# Patient Record
Sex: MALE | Race: WHITE | NOT HISPANIC OR LATINO | Employment: FULL TIME | URBAN - METROPOLITAN AREA
[De-identification: names, ages, dates, MRNs, and addresses within clinical notes are randomized per-mention and may not be internally consistent; named-entity substitution may affect disease eponyms.]

---

## 2017-02-06 ENCOUNTER — MYC MEDICAL ADVICE (OUTPATIENT)
Dept: FAMILY MEDICINE | Facility: CLINIC | Age: 52
End: 2017-02-06

## 2017-02-06 DIAGNOSIS — E78.5 HYPERLIPIDEMIA LDL GOAL <130: Primary | ICD-10-CM

## 2017-02-06 DIAGNOSIS — Z82.49 FH: CARDIOVASCULAR DISEASE: ICD-10-CM

## 2017-02-07 NOTE — TELEPHONE ENCOUNTER
Previous order for lipid and alt was over a year old, renewed the orders and advised via mychart, per his request   Tiff Costello RNC

## 2017-02-08 ENCOUNTER — MYC MEDICAL ADVICE (OUTPATIENT)
Dept: FAMILY MEDICINE | Facility: CLINIC | Age: 52
End: 2017-02-08

## 2017-02-08 ENCOUNTER — TELEPHONE (OUTPATIENT)
Dept: FAMILY MEDICINE | Facility: CLINIC | Age: 52
End: 2017-02-08

## 2017-02-08 NOTE — TELEPHONE ENCOUNTER
Dr. Reynolds,    Patient questioning if you want him to have a PSA test when he is having his Lipids done on 2/20/17.  I have pended for your approval or not. Tamra TREJO RN

## 2017-02-08 NOTE — TELEPHONE ENCOUNTER
I sent a [employer] message explaining to patient current prostate screening recommendations. Will wait for patient reply before ordering the test.

## 2017-02-20 DIAGNOSIS — Z82.49 FH: CARDIOVASCULAR DISEASE: ICD-10-CM

## 2017-02-20 DIAGNOSIS — E78.5 HYPERLIPIDEMIA LDL GOAL <130: ICD-10-CM

## 2017-02-20 LAB
ALT SERPL W P-5'-P-CCNC: 37 U/L (ref 0–70)
CHOLEST SERPL-MCNC: 207 MG/DL
HDLC SERPL-MCNC: 38 MG/DL
LDLC SERPL CALC-MCNC: 120 MG/DL
NONHDLC SERPL-MCNC: 169 MG/DL
TRIGL SERPL-MCNC: 246 MG/DL

## 2017-02-20 PROCEDURE — 80061 LIPID PANEL: CPT | Performed by: FAMILY MEDICINE

## 2017-02-20 PROCEDURE — 36415 COLL VENOUS BLD VENIPUNCTURE: CPT | Performed by: FAMILY MEDICINE

## 2017-02-20 PROCEDURE — 84460 ALANINE AMINO (ALT) (SGPT): CPT | Performed by: FAMILY MEDICINE

## 2017-02-22 ENCOUNTER — OFFICE VISIT (OUTPATIENT)
Dept: FAMILY MEDICINE | Facility: CLINIC | Age: 52
End: 2017-02-22
Payer: COMMERCIAL

## 2017-02-22 VITALS
HEART RATE: 75 BPM | WEIGHT: 223.4 LBS | TEMPERATURE: 97.7 F | DIASTOLIC BLOOD PRESSURE: 78 MMHG | HEIGHT: 74 IN | BODY MASS INDEX: 28.67 KG/M2 | SYSTOLIC BLOOD PRESSURE: 108 MMHG

## 2017-02-22 DIAGNOSIS — Z13.1 SCREENING FOR DIABETES MELLITUS: ICD-10-CM

## 2017-02-22 DIAGNOSIS — E78.5 HYPERLIPIDEMIA LDL GOAL <130: ICD-10-CM

## 2017-02-22 DIAGNOSIS — E66.3 OVERWEIGHT (BMI 25.0-29.9): ICD-10-CM

## 2017-02-22 DIAGNOSIS — I77.810 ASCENDING AORTA DILATATION (H): ICD-10-CM

## 2017-02-22 DIAGNOSIS — Z11.59 NEED FOR HEPATITIS C SCREENING TEST: ICD-10-CM

## 2017-02-22 DIAGNOSIS — Z00.01 ENCOUNTER FOR ROUTINE ADULT MEDICAL EXAM WITH ABNORMAL FINDINGS: Primary | ICD-10-CM

## 2017-02-22 PROCEDURE — 99396 PREV VISIT EST AGE 40-64: CPT | Performed by: FAMILY MEDICINE

## 2017-02-22 RX ORDER — SIMVASTATIN 20 MG
20 TABLET ORAL AT BEDTIME
Qty: 90 TABLET | Refills: 0 | Status: SHIPPED | OUTPATIENT
Start: 2017-02-22 | End: 2017-05-22

## 2017-02-22 NOTE — PATIENT INSTRUCTIONS
Observe low fat diet.    Eat 5 cups of vegetables, fruits and whole grains per day.  Limit starchy food (white rice, white bread, white pasta, white potatoes) to less than a cup per meal.  Minimize sweets, junk food and fastfood.  Exercise: moderate intensity sustained for at least 30 mins per episode, goal of 150 mins per week at least  Weight loss goal: 5-10 % of current weight in 4-6 months.    Thank you for choosing [facility].  You may be receiving a survey in the mail from [employer] regarding your visit today.  Please take a few minutes to complete and return the survey to let us know how we are doing.      If you have questions or concerns, please contact us via [facility] or you can contact your care team at [phone].    Our Clinic hours are:  Monday 6:40 am  to 7:00 pm  Tuesday -Friday 6:40 am to 5:00 pm    The [place] outpatient lab hours are:  Monday - Friday 6:10 am to 4:45 pm  Saturdays 7:00 am to 11:00 am  Appointments are required, call [phone]    If you have clinical questions after hours or would like to schedule an appointment,  call the clinic at [phone].    Preventive Health Recommendations  Male Ages 50   64    Yearly exam:             See your health care provider every year in order to  o   Review health changes.   o   Discuss preventive care.    o   Review your medicines if your doctor has prescribed any.     Have a cholesterol test every 5 years, or more frequently if you are at risk for high cholesterol/heart disease.     Have a diabetes test (fasting glucose) every three years. If you are at risk for diabetes, you should have this test more often.     Have a colonoscopy at age 50, or have a yearly FIT test (stool test). These exams will check for colon cancer.      Talk with your health care provider about whether or not a prostate cancer screening test (PSA) is right for you.    You should be tested each year for STDs (sexually transmitted diseases), if you re at risk.      Shots: Get a flu shot each year. Get a tetanus shot every 10 years.     Nutrition:    Eat at least 5 servings of fruits and vegetables daily.     Eat whole-grain bread, whole-wheat pasta and brown rice instead of white grains and rice.     Talk to your provider about Calcium and Vitamin D.     Lifestyle    Exercise for at least 150 minutes a week (30 minutes a day, 5 days a week). This will help you control your weight and prevent disease.     Limit alcohol to one drink per day.     No smoking.     Wear sunscreen to prevent skin cancer.     See your dentist every six months for an exam and cleaning.     See your eye doctor every 1 to 2 years.    Low-Fat Diet    A low-fat diet will help you lose weight. It also can lower cholesterol and prevent symptoms of gallbladder disease. The average American diet contains up to 50% fat. This means that 50% of all calories come from fat (80 grams to 100 grams of fat per day). Choosing normal portions of foods from the list below can help lower your fat intake. The [place] of [employer] recommends 20% to 35% of your daily calories come from fat. The American Heart Association suggests limiting the amount of unhealthy fats in your diet to fewer than 7% of calories from saturated fats and fewer than 1% from trans fats. The remaining 65% to 80% of calories will come from protein and carbohydrates. This is much healthier for you.  Beverages  Ok: Nonfat milk, coffee, tea, carbonated beverages  Avoid: Whole and reduced-fat milk, evaporated and condensed milk; hot chocolate mixes, milk shakes, malts, eggnog  Bread  Ok: Whole wheat or rye bread, oliverio or soda crackers, akash toast, plain rolls, bagels, English muffins  Avoid: Rolls and breads containing whole milk or egg, waffles, pancakes, biscuits, corn bread; cheese crackers, other flavored crackers, pastries, doughnuts  Cereal  Ok: Oatmeal, whole wheat, bran, multi grain, rice  Avoid: Granola or other cereals containing oil,  "coconut, or more than 2 grams of fat per serving  Desserts  Ok: Gelatin, slushy, mendez food cake, puddings or sherbet made with nonfat milk, meringues, and nonfat yogurt  Avoid: Any other commercially prepared desserts or desserts containing fat, whole milk, cream, chocolate, and coconut  Fats  Ok: You may have up to 3 teaspoons of fat daily. This can be in the form of butter, margarine, mayonnaise, or healthy oils (canola or olive)  Avoid: Cream, non-dairy creams, cream cheese, gravies, and cream sauces  Fruits  Ok: All fruits prepared without fat  Avoid: Coconut, olives  Meats, poultry, fish  Ok: Limit meat to 6 oz daily (broiled, roasted, baked, grilled, or boiled). Select lean cuts, well-trimmed of fat: beef, fish, lamb, pork and canned fish packed in water; chicken and turkey with the skin removed.  Avoid: Fried meats, fish, poultry, fried eggs and fish canned in oils; fatty meats such as calero, sausage, corned beef, hot dogs, luncheon meats, or meats with gravies and sauces  Cheese and eggs  Ok: Cheeses labeled \"low fat\"; 3 whole eggs per week, egg whites and egg substitutes as desired  Avoid: All other cheeses  Potatoes, beans, pasta  Ok: Dried beans, split peas, lentils, potatoes, rice, pasta prepared without added fat  Avoid: French fries, potato chips, potatoes prepared with butter, refried beans  Soups  Ok: Bouillon or broth soups without fat and with allowed vegetables  Avoid: Cream based soups  Vegetables  Ok: Fresh, frozen, canned or dried vegetables all prepared without added fat  Avoid: Fried vegetables and those prepared with butter, cream, sauces  Miscellaneous  Ok: Salt, sugar, jelly, hard candy, marshmallows, honey, syrup, spices and herbs, mustard, catsup, lemon, vinegar (to maintain an overall healthy diet, try to limit sweets and added sugars)  Avoid: Pizza, chocolate, nuts, coconut, cream candies, sunflower, sesame, and other seeds, fried foods, and cream sauces and gravies    7879-6329 The " [employer]. [address] [place] [address], [place] [address]. All rights reserved. This information is not intended as a substitute for professional medical care. Always follow your healthcare professional's instructions.

## 2017-02-22 NOTE — MR AVS SNAPSHOT
After Visit Summary   2/22/2017    Bright Andrew    MRN: [id number]           Patient Information     Date Of Birth          1965        Visit Information        Provider Department      2/22/2017 7:00 AM Moises Reynolds MD [facility]        Today's Diagnoses     Encounter for routine adult medical exam with abnormal findings    -  1    Overweight (BMI 25.0-29.9)        Hyperlipidemia LDL goal <130        Ascending aorta dilatation (H)        Need for hepatitis C screening test        Screening for diabetes mellitus          Care Instructions    Observe low fat diet.    Eat 5 cups of vegetables, fruits and whole grains per day.  Limit starchy food (white rice, white bread, white pasta, white potatoes) to less than a cup per meal.  Minimize sweets, junk food and fastfood.  Exercise: moderate intensity sustained for at least 30 mins per episode, goal of 150 mins per week at least  Weight loss goal: 5-10 % of current weight in 4-6 months.    Thank you for choosing [facility].  You may be receiving a survey in the mail from [email or web address] regarding your visit today.  Please take a few minutes to complete and return the survey to let us know how we are doing.      If you have questions or concerns, please contact us via [employer] or you can contact your care team at [phone].    Our Clinic hours are:  Monday 6:40 am  to 7:00 pm  Tuesday -Friday 6:40 am to 5:00 pm    The [place] outpatient lab hours are:  Monday - Friday 6:10 am to 4:45 pm  Saturdays 7:00 am to 11:00 am  Appointments are required, call [phone]    If you have clinical questions after hours or would like to schedule an appointment,  call the clinic at [phone].    Preventive Health Recommendations  Male Ages 50 - 64    Yearly exam:             See your health care provider every year in order to  o   Review health changes.   o   Discuss preventive care.    o   Review your medicines if your doctor  has prescribed any.     Have a cholesterol test every 5 years, or more frequently if you are at risk for high cholesterol/heart disease.     Have a diabetes test (fasting glucose) every three years. If you are at risk for diabetes, you should have this test more often.     Have a colonoscopy at age 50, or have a yearly FIT test (stool test). These exams will check for colon cancer.      Talk with your health care provider about whether or not a prostate cancer screening test (PSA) is right for you.    You should be tested each year for STDs (sexually transmitted diseases), if you re at risk.     Shots: Get a flu shot each year. Get a tetanus shot every 10 years.     Nutrition:    Eat at least 5 servings of fruits and vegetables daily.     Eat whole-grain bread, whole-wheat pasta and brown rice instead of white grains and rice.     Talk to your provider about Calcium and Vitamin D.     Lifestyle    Exercise for at least 150 minutes a week (30 minutes a day, 5 days a week). This will help you control your weight and prevent disease.     Limit alcohol to one drink per day.     No smoking.     Wear sunscreen to prevent skin cancer.     See your dentist every six months for an exam and cleaning.     See your eye doctor every 1 to 2 years.    Low-Fat Diet    A low-fat diet will help you lose weight. It also can lower cholesterol and prevent symptoms of gallbladder disease. The average American diet contains up to 50% fat. This means that 50% of all calories come from fat (80 grams to 100 grams of fat per day). Choosing normal portions of foods from the list below can help lower your fat intake. The [place] of Medicine recommends 20% to 35% of your daily calories come from fat. The American Heart Association suggests limiting the amount of unhealthy fats in your diet to fewer than 7% of calories from saturated fats and fewer than 1% from trans fats. The remaining 65% to 80% of calories will come from protein and  "carbohydrates. This is much healthier for you.  Beverages  Ok: Nonfat milk, coffee, tea, carbonated beverages  Avoid: Whole and reduced-fat milk, evaporated and condensed milk; hot chocolate mixes, milk shakes, malts, eggnog  Bread  Ok: Whole wheat or rye bread, oliverio or soda crackers, akash toast, plain rolls, bagels, English muffins  Avoid: Rolls and breads containing whole milk or egg, waffles, pancakes, biscuits, corn bread; cheese crackers, other flavored crackers, pastries, doughnuts  Cereal  Ok: Oatmeal, whole wheat, bran, multi grain, rice  Avoid: Granola or other cereals containing oil, coconut, or more than 2 grams of fat per serving  Desserts  Ok: Gelatin, slushy, mendez food cake, puddings or sherbet made with nonfat milk, meringues, and nonfat yogurt  Avoid: Any other commercially prepared desserts or desserts containing fat, whole milk, cream, chocolate, and coconut  Fats  Ok: You may have up to 3 teaspoons of fat daily. This can be in the form of butter, margarine, mayonnaise, or healthy oils (canola or olive)  Avoid: Cream, non-dairy creams, cream cheese, gravies, and cream sauces  Fruits  Ok: All fruits prepared without fat  Avoid: Coconut, olives  Meats, poultry, fish  Ok: Limit meat to 6 oz daily (broiled, roasted, baked, grilled, or boiled). Select lean cuts, well-trimmed of fat: beef, fish, lamb, pork and canned fish packed in water; chicken and turkey with the skin removed.  Avoid: Fried meats, fish, poultry, fried eggs and fish canned in oils; fatty meats such as calero, sausage, corned beef, hot dogs, luncheon meats, or meats with gravies and sauces  Cheese and eggs  Ok: Cheeses labeled \"low fat\"; 3 whole eggs per week, egg whites and egg substitutes as desired  Avoid: All other cheeses  Potatoes, beans, pasta  Ok: Dried beans, split peas, lentils, potatoes, rice, pasta prepared without added fat  Avoid: French fries, potato chips, potatoes prepared with butter, refried beans  Soups  Ok: " Bouillon or broth soups without fat and with allowed vegetables  Avoid: Cream based soups  Vegetables  Ok: Fresh, frozen, canned or dried vegetables all prepared without added fat  Avoid: Fried vegetables and those prepared with butter, cream, sauces  Miscellaneous  Ok: Salt, sugar, jelly, hard candy, marshmallows, honey, syrup, spices and herbs, mustard, catsup, lemon, vinegar (to maintain an overall healthy diet, try to limit sweets and added sugars)  Avoid: Pizza, chocolate, nuts, coconut, cream candies, sunflower, sesame, and other seeds, fried foods, and cream sauces and gravies    2816-1596 The "[employer] [address] [place] [address]. All rights reserved. This information is not intended as a substitute for professional medical care. Always follow your healthcare professional's instructions.              Follow-ups after your visit        Future tests that were ordered for you today     Open Future Orders        Priority Expected Expires Ordered    Lipid panel reflex to direct LDL Routine 5/24/2017 8/23/2017 2/22/2017    ALT Routine 5/24/2017 8/23/2017 2/22/2017    Echocardiogram Complete Routine  2/22/2018 2/22/2017    Glucose Routine 5/1/2017 2/22/2018 2/22/2017            Who to contact     If you have questions or need follow up information about today's clinic visit or your schedule please contact [facility] directly at [phone].  Normal or non-critical lab and imaging results will be communicated to you by [employer], letter or phone within 4 business days after the clinic has received the results. If you do not hear from us within 7 days, please contact the clinic through [employer] or phone. If you have a critical or abnormal lab result, we will notify you by phone as soon as possible.  Submit refill requests through mascotsecret or call your pharmacy and they will forward the refill request to us. Please allow 3 business days for your refill to be completed.           "Additional Information About Your Visit        [employer] Information     [employer] gives you secure access to your electronic health record. If you see a primary care provider, you can also send messages to your care team and make appointments. If you have questions, please call your primary care clinic.  If you do not have a primary care provider, please call [phone] and they will assist you.        Care EveryWhere ID     This is your Care EveryWhere ID. This could be used by other organizations to access your [facility] medical records  [id number]        Your Vitals Were     Pulse Temperature Height BMI (Body Mass Index)          75 97.7  F (36.5  C) (Tympanic) 6' 2.25\" (1.886 m) 28.49 kg/m2         Blood Pressure from Last 3 Encounters:   02/22/17 108/78   08/29/16 128/80   03/24/16 121/76    Weight from Last 3 Encounters:   02/22/17 223 lb 6.4 oz (101.3 kg)   06/09/16 220 lb (99.8 kg)   03/24/16 220 lb (99.8 kg)              We Performed the Following     Hepatitis C Screen Reflex to HCV RNA Quant and Genotype          Where to get your medicines      These medications were sent to [facility] [address]  [address] [facility] [address]     Phone:  [phone]     simvastatin 20 MG tablet          Primary Care Provider Office Phone # Fax #    Moises Reynolds [phone]       [address] [address]        Thank you!     Thank you for choosing [facility]  for your care. Our goal is always to provide you with excellent care. Hearing back from our patients is one way we can continue to improve our services. Please take a few minutes to complete the written survey that you may receive in the mail after your visit with us. Thank you!             Your Updated Medication List - Protect others around you: Learn how to safely use, store and throw away your medicines at " www.disposemymeds.org.          This list is accurate as of: 2/22/17  7:49 AM.  Always use your most recent med list.                   Brand Name Dispense Instructions for use    ADVIL 200 MG capsule   Generic drug:  ibuprofen      Take 200 mg by mouth every 4 hours as needed. Taking 800 mg every 6 hours       aspirin 81 MG tablet      Take 81 mg by mouth daily       fish oil-omega-3 fatty acids 1000 MG capsule     90 capsule    Take 1 capsule (1 g) by mouth daily       loratadine 10 MG tablet    CLARITIN     Take 10 mg by mouth daily as needed.       simvastatin 20 MG tablet    ZOCOR    90 tablet    Take 1 tablet (20 mg) by mouth At Bedtime

## 2017-02-22 NOTE — PROGRESS NOTES
SUBJECTIVE:     CC: Birght Andrew is an 51 year old male who presents for preventative health visit.     Physical   Annual:     Getting at least 3 servings of Calcium per day::  Yes    Bi-annual eye exam::  Yes    Dental care twice a year::  Yes    Sleep apnea or symptoms of sleep apnea::  None    Diet::  Low fat/cholesterol    Frequency of exercise::  2-3 days/week    Duration of exercise::  15-30 minutes    Taking medications regularly::  Yes    Medication side effects::  None    Additional concerns today::  No    Would like to discuss getting another heart scan, echocardiogram, family history, father  with heart issue, brother has enlarged aorta, he has been checked but would like to update.  In 2015, echo showed no change of borderline aortic dilatation in 3 years. Patient denies chest pain, shortness of breath, dizziness or LOC.    Today's PHQ-2 Score:   PHQ-2 (  [employer]) 2017   Little interest or pleasure in doing things Not at all   Feeling down, depressed or hopeless Not at all   PHQ-2 Score 0       Abuse: Current or Past(Physical, Sexual or Emotional)- No  Do you feel safe in your environment - Yes    Social History   Substance Use Topics     Smoking status: Never Smoker     Smokeless tobacco: Never Used     Alcohol use Yes      Comment: few times a week     The patient does not drink >3 drinks per day nor >7 drinks per week.    Last PSA:   PSA   Date Value Ref Range Status   2015 1.02 0 - 4 ug/L Final       Recent Labs   Lab Test  17   0713  16   0635  01/21/15   0631  14   0739   CHOL  207*  207*  201*  203*   HDL  38*  48  43  38*   LDL  120*  122*  124  147*   TRIG  246*  187*  169*  90   CHOLHDLRATIO   --    --   4.7  5.0   NHDL  169*  159*   --    --        Reviewed orders with patient. Reviewed health maintenance and updated orders accordingly - Yes    All Histories reviewed and updated in Epic.  Past Medical History   Diagnosis Date     Cellulitis and  abscess of unspecified site      recurrent      Chest pain, unspecified       Past Surgical History   Procedure Laterality Date     Colonoscopy N/A 11/20/2015     Procedure: COLONOSCOPY;  Surgeon: Melquiades Street MD;  Location: [facility]       ROS:  C: NEGATIVE for fever, chills, change in weight  I: NEGATIVE for worrisome rashes, moles or lesions  E: NEGATIVE for vision changes or irritation  ENT: NEGATIVE for ear, mouth and throat problems  R: NEGATIVE for significant cough or SOB  CV: NEGATIVE for chest pain, palpitations or peripheral edema  GI: NEGATIVE for nausea, abdominal pain, heartburn, or change in bowel habits   male: negative for dysuria, hematuria, decreased urinary stream, erectile dysfunction, urethral discharge  M: NEGATIVE for significant arthralgias or myalgia  N: NEGATIVE for weakness, dizziness or paresthesias  E: NEGATIVE for temperature intolerance, skin/hair changes  H: NEGATIVE for bleeding problems  P: NEGATIVE for changes in mood or affect    Labs reviewed in EPIC  BP Readings from Last 3 Encounters:   02/22/17 108/78   08/29/16 128/80   03/24/16 121/76    Wt Readings from Last 3 Encounters:   02/22/17 223 lb 6.4 oz (101.3 kg)   06/09/16 220 lb (99.8 kg)   03/24/16 220 lb (99.8 kg)                  Patient Active Problem List   Diagnosis     Hyperlipidemia LDL goal <130     Screening for hypertension     FH: cardiovascular disease     Lumbago     Overweight (BMI 25.0-29.9)     Past Surgical History   Procedure Laterality Date     Colonoscopy N/A 11/20/2015     Procedure: COLONOSCOPY;  Surgeon: Melquiades Street MD;  Location: [facility]       Social History   Substance Use Topics     Smoking status: Never Smoker     Smokeless tobacco: Never Used     Alcohol use Yes      Comment: few times a week     Family History   Problem Relation Age of Onset     C.A.D. Maternal Grandfather      Cardiovascular Maternal Grandfather      HEART DISEASE Father      congenital heart dz and blockage  "    CANCER Father      lung         Current Outpatient Prescriptions   Medication Sig Dispense Refill     aspirin 81 MG tablet Take 81 mg by mouth daily       simvastatin (ZOCOR) 20 MG tablet Take 1 tablet (20 mg) by mouth At Bedtime 90 tablet 0     fish oil-omega-3 fatty acids (FISH OIL) 1000 MG capsule Take 1 capsule (1 g) by mouth daily 90 capsule prn     loratadine (CLARITIN) 10 MG tablet Take 10 mg by mouth daily as needed.       Ibuprofen (ADVIL) 200 MG capsule Take 200 mg by mouth every 4 hours as needed. Taking 800 mg every 6 hours       [DISCONTINUED] simvastatin (ZOCOR) 20 MG tablet Take 1 tablet (20 mg) by mouth At Bedtime 90 tablet 3     No Known Allergies  OBJECTIVE:     /78 (BP Location: Right arm, Patient Position: Chair, Cuff Size: Adult Large)  Pulse 75  Temp 97.7  F (36.5  C) (Tympanic)  Ht 6' 2.25\" (1.886 m)  Wt 223 lb 6.4 oz (101.3 kg)  BMI 28.49 kg/m2  EXAM:  GENERAL APPEARANCE: overweight,  alert and no distress  EYES: pink conj, no icterus, PERRL, EOMI  HENT: ear canals and TM's normal, nose and mouth without ulcers or lesions, oropharynx clear and oral mucous membranes moist  NECK: no adenopathy, no asymmetry, masses, or scars and thyroid normal to palpation  RESP: lungs clear to auscultation - no rales, rhonchi or wheezes  CV: regular rates and rhythm, normal S1 S2, no S3 or S4, no murmur, click or rub, no peripheral edema and peripheral pulses strong  ABDOMEN: soft, nontender, no hepatosplenomegaly, no masses and bowel sounds normal  RECTAL: deferred  MS: no musculoskeletal defects are noted and gait is age appropriate without ataxia  SKIN: no suspicious lesions or rashes  NEURO: Normal strength and tone, sensory exam grossly normal, mentation intact and speech normal    ASSESSMENT/PLAN:     Bright was seen today for physical.    Diagnoses and all orders for this visit:    Encounter for routine adult medical exam with abnormal findings  Patient was advised on recommended screening and " "preventive health recommendations.  He verbalized understanding and agreed to the plans below.      Overweight (BMI 25.0-29.9)  Patient was advised healthy diet recommendations.  Patient was advised weekly exercise recommendations and goals.    Hyperlipidemia LDL goal <130  -     Lipid panel reflex to direct LDL; Future  -     ALT; Future  -     simvastatin (ZOCOR) 20 MG tablet; Take 1 tablet (20 mg) by mouth At Bedtime  Reinforced heart healthy lifestyle.  Patient states he prefers to continue current med, restart fish oil, and be more consistent w/ diet and exercise for now.    Ascending aorta dilatation (H)  -     Echocardiogram Complete; Future  Repeat this year as there is fam hx of his brother's aortic dilatation worsening.    Need for hepatitis C screening test  -     **Hepatitis C Screen Reflex to RNA FUTURE anytime; Future    Screening for diabetes mellitus  -     Glucose; Future      COUNSELING:   Reviewed preventive health counseling, as reflected in patient instructions       Regular exercise       Healthy diet/nutrition       Vision screening       Hearing screening       Alcohol Use       Family planning       Safe sex practices/STD prevention       Consider Hep C screening for patients born between 1945 and 1965       Prostate cancer screening       Osteoporosis Prevention/Bone Health       The 10-year ASCVD risk score (Logannata OROZCO Jr., et al., 2013) is: 3.9%    Values used to calculate the score:      Age: 51 years      Sex: Male      Is Non- : No      Diabetic: No      Tobacco smoker: No      Systolic Blood Pressure: 108 mmHg      Is Prescribed Antihypertensives: No      HDL Cholesterol: 38 mg/dL      Total Cholesterol: 207 mg/dL         reports that he has never smoked. He has never used smokeless tobacco.    Estimated body mass index is 28.49 kg/(m^2) as calculated from the following:    Height as of this encounter: 6' 2.25\" (1.886 m).    Weight as of this encounter: 223 lb 6.4 " oz (101.3 kg).   Weight management plan: Discussed healthy diet and exercise guidelines and patient will follow up in 12 months in clinic to re-evaluate.    Counseling Resources:  ATP IV Guidelines  Pooled Cohorts Equation Calculator  FRAX Risk Assessment  ICSI Preventive Guidelines  Dietary Guidelines for Americans, 2010  USDA's MyPlate  ASA Prophylaxis  Lung CA Screening    Moises Reynolds MD  [facility]

## 2017-02-22 NOTE — NURSING NOTE
"Chief Complaint   Patient presents with     Physical       Initial /78 (BP Location: Right arm, Patient Position: Chair, Cuff Size: Adult Large)  Pulse 75  Temp 97.7  F (36.5  C) (Tympanic)  Ht 6' 2.25\" (1.886 m)  Wt 223 lb 6.4 oz (101.3 kg)  BMI 28.49 kg/m2 Estimated body mass index is 28.49 kg/(m^2) as calculated from the following:    Height as of this encounter: 6' 2.25\" (1.886 m).    Weight as of this encounter: 223 lb 6.4 oz (101.3 kg).  Medication Reconciliation: complete  "

## 2017-03-09 ENCOUNTER — HOSPITAL ENCOUNTER (OUTPATIENT)
Dept: CARDIOLOGY | Facility: CLINIC | Age: 52
Discharge: HOME | End: 2017-03-09
Attending: FAMILY MEDICINE | Admitting: FAMILY MEDICINE
Payer: COMMERCIAL

## 2017-03-09 DIAGNOSIS — I77.810 ASCENDING AORTA DILATATION (H): ICD-10-CM

## 2017-03-09 PROCEDURE — 93306 TTE W/DOPPLER COMPLETE: CPT

## 2017-03-09 PROCEDURE — 93306 TTE W/DOPPLER COMPLETE: CPT | Mod: 26 | Performed by: INTERNAL MEDICINE

## 2017-05-19 ENCOUNTER — MYC MEDICAL ADVICE (OUTPATIENT)
Dept: FAMILY MEDICINE | Facility: CLINIC | Age: 52
End: 2017-05-19

## 2017-05-19 DIAGNOSIS — Z00.01 ENCOUNTER FOR ROUTINE ADULT MEDICAL EXAM WITH ABNORMAL FINDINGS: ICD-10-CM

## 2017-05-19 DIAGNOSIS — E78.5 HYPERLIPIDEMIA LDL GOAL <130: ICD-10-CM

## 2017-05-19 DIAGNOSIS — Z13.1 SCREENING FOR DIABETES MELLITUS: ICD-10-CM

## 2017-05-19 LAB
ALT SERPL W P-5'-P-CCNC: 23 U/L (ref 0–70)
CHOLEST SERPL-MCNC: 186 MG/DL
GLUCOSE SERPL-MCNC: 88 MG/DL (ref 70–99)
HCV AB SERPL QL IA: NORMAL
HDLC SERPL-MCNC: 44 MG/DL
LDLC SERPL CALC-MCNC: 122 MG/DL
NONHDLC SERPL-MCNC: 142 MG/DL
TRIGL SERPL-MCNC: 100 MG/DL

## 2017-05-19 PROCEDURE — 84460 ALANINE AMINO (ALT) (SGPT): CPT | Performed by: FAMILY MEDICINE

## 2017-05-19 PROCEDURE — 36415 COLL VENOUS BLD VENIPUNCTURE: CPT | Performed by: FAMILY MEDICINE

## 2017-05-19 PROCEDURE — 82947 ASSAY GLUCOSE BLOOD QUANT: CPT | Performed by: FAMILY MEDICINE

## 2017-05-19 PROCEDURE — 86803 HEPATITIS C AB TEST: CPT | Performed by: FAMILY MEDICINE

## 2017-05-19 PROCEDURE — 80061 LIPID PANEL: CPT | Performed by: FAMILY MEDICINE

## 2017-06-21 ENCOUNTER — MYC REFILL (OUTPATIENT)
Dept: FAMILY MEDICINE | Facility: CLINIC | Age: 52
End: 2017-06-21

## 2017-06-21 DIAGNOSIS — E78.5 HYPERLIPIDEMIA LDL GOAL <130: ICD-10-CM

## 2017-06-21 RX ORDER — SIMVASTATIN 20 MG
20 TABLET ORAL AT BEDTIME
Qty: 90 TABLET | Refills: 3 | Status: SHIPPED | OUTPATIENT
Start: 2017-06-21 | End: 2018-06-20

## 2017-06-21 NOTE — TELEPHONE ENCOUNTER
Message from [employer]:  Original authorizing provider: MD Bright Keita would like a refill of the following medications:  simvastatin (ZOCOR) 20 MG tablet [Angelic Kraft MD]    Preferred pharmacy: [facility] - [place], [place] - [address]    Comment:  Can I get a prescription renewal for simvastatin. I have always been given a yearly prescription that I refill every 3 months. After my physical each year, the prescription is renewed. After my physical this year, I was only given a one month prescription. Bright Andrew

## 2017-06-21 NOTE — TELEPHONE ENCOUNTER
Simvastatin  Last Written Prescription Date: 5/22/17  Last Fill Quantity: 30, # refills: 0  Last Office Visit with [facility], [facility] or [facility] prescribing provider: 2/22/17       Lab Results   Component Value Date    CHOL 186 05/19/2017     Lab Results   Component Value Date    HDL 44 05/19/2017     Lab Results   Component Value Date     05/19/2017     Lab Results   Component Value Date    TRIG 100 05/19/2017     Lab Results   Component Value Date    CHOLHDLRATIO 4.7 01/21/2015     Prescription approved per [facility] Refill Protocol.  Tiff Costello RNC

## 2017-09-05 ENCOUNTER — TELEPHONE (OUTPATIENT)
Dept: PODIATRY | Facility: CLINIC | Age: 52
End: 2017-09-05

## 2017-09-05 DIAGNOSIS — M21.40 PES PLANUS: ICD-10-CM

## 2017-09-05 DIAGNOSIS — M72.2 PLANTAR FASCIITIS OF LEFT FOOT: Primary | ICD-10-CM

## 2017-09-05 NOTE — TELEPHONE ENCOUNTER
Reason for Call:  Other call back    Detailed comments: pt calling stating he was seen about a year ago and got an order for orthotics he never went in to get them. He would like the order to be updated has it has now .     Phone Number Patient can be reached at: Cell number on file:    Telephone Information:   Mobile [phone]       Best Time: any     Can we leave a detailed message on this number? YES    Call taken on 2017 at 8:52 AM by Susana Whalen

## 2017-09-05 NOTE — TELEPHONE ENCOUNTER
Updated orthotics referral provided as this had been previously recommended.  LVM with detailed information regarding that update order provided and scheduling info provided.     Renzo Degroot, ATC

## 2017-09-11 ENCOUNTER — TELEPHONE (OUTPATIENT)
Dept: PODIATRY | Facility: CLINIC | Age: 52
End: 2017-09-11

## 2017-09-11 DIAGNOSIS — M72.2 PLANTAR FASCIITIS, LEFT: Primary | ICD-10-CM

## 2017-09-11 DIAGNOSIS — M21.41 PES PLANUS OF BOTH FEET: ICD-10-CM

## 2017-09-11 DIAGNOSIS — M21.42 PES PLANUS OF BOTH FEET: ICD-10-CM

## 2017-09-18 ENCOUNTER — OFFICE VISIT (OUTPATIENT)
Dept: FAMILY MEDICINE | Facility: CLINIC | Age: 52
End: 2017-09-18
Payer: COMMERCIAL

## 2017-09-18 VITALS
BODY MASS INDEX: 29.52 KG/M2 | DIASTOLIC BLOOD PRESSURE: 78 MMHG | SYSTOLIC BLOOD PRESSURE: 137 MMHG | HEART RATE: 83 BPM | HEIGHT: 74 IN | TEMPERATURE: 97.3 F | WEIGHT: 230 LBS

## 2017-09-18 DIAGNOSIS — Z23 NEED FOR PROPHYLACTIC VACCINATION AND INOCULATION AGAINST INFLUENZA: ICD-10-CM

## 2017-09-18 DIAGNOSIS — M54.42 LUMBAGO WITH SCIATICA, LEFT SIDE: Primary | ICD-10-CM

## 2017-09-18 PROCEDURE — 99213 OFFICE O/P EST LOW 20 MIN: CPT | Mod: 25 | Performed by: FAMILY MEDICINE

## 2017-09-18 PROCEDURE — 90686 IIV4 VACC NO PRSV 0.5 ML IM: CPT | Performed by: FAMILY MEDICINE

## 2017-09-18 PROCEDURE — 90471 IMMUNIZATION ADMIN: CPT | Performed by: FAMILY MEDICINE

## 2017-09-18 RX ORDER — METHYLPREDNISOLONE 4 MG
TABLET, DOSE PACK ORAL
Qty: 21 TABLET | Refills: 0 | Status: SHIPPED | OUTPATIENT
Start: 2017-09-18 | End: 2017-12-18

## 2017-09-18 RX ORDER — CYCLOBENZAPRINE HCL 10 MG
10 TABLET ORAL 2 TIMES DAILY PRN
Qty: 30 TABLET | Refills: 0 | Status: SHIPPED | OUTPATIENT
Start: 2017-09-18 | End: 2018-01-05

## 2017-09-18 NOTE — NURSING NOTE
"Chief Complaint   Patient presents with     Back Pain     Pt has had some lower back pain for the past couple wks.       Initial /78  Pulse 83  Temp 97.3  F (36.3  C) (Tympanic)  Ht 6' 2.25\" (1.886 m)  Wt 230 lb (104.3 kg)  BMI 29.33 kg/m2 Estimated body mass index is 29.33 kg/(m^2) as calculated from the following:    Height as of this encounter: 6' 2.25\" (1.886 m).    Weight as of this encounter: 230 lb (104.3 kg).  Medication Reconciliation: complete  Gladis Urbina CMA    "

## 2017-09-18 NOTE — PATIENT INSTRUCTIONS
"Your history and exam suggest left sided sciatica.    Ibuprofen 200 mg 1-2 tablets with food every 8 hrs as needed for pain  Cyclobenzaprine as prescribed for spasms. Don't take when driving.  Medrol dose pack as prescribed.  Observe proper lifting techniques.    Warm compress to the back as needed for pain.  Bengay, icy Hot, or Aspercreme topically to the back as needed.  Call clinic if with new symptoms.    See doctor if with incontinence or leg weakness or numbness.   If no improvement after 1-2 weeks, notify clinic.  Possible physical therapy then.      * Sciatica    Sciatica (\"Lumbar Radiculopathy\") causes a pain that spreads from the lower back down into the buttock, hip and leg. Sometimes leg pain can occur without any back pain. Sciatica is due to irritation or pressure on a spinal nerve as it comes out of the spinal canal. This is most often due to a bulge or rupture of a nearby spinal disk (the cartilage cushion between each spinal bone), which presses on a nearby nerve. Other causes include spinal stenosis (narrowing of the spinal canal) and spasm of the piriformis muscle (a muscle in the buttocks that the sciatic nerve passes through).  Sciatica may begin after a sudden twisting/bending force (such as in a car accident), or sometimes after a simple awkward movement. In either case, muscle spasm is commonly present and contributes to the pain.  The diagnosis of sciatica is made from the symptoms and physical exam. Unless you had a physical injury (such as a car accident or fall), X-rays are usually not ordered for the initial evaluation of sciatica because the nerves and disks cannot be seen on an X-ray. Most sciatica (80-90%) gets better with time.  What can I do about my low back pain?  There are three main things you can do to ease low back pain and help it go away.    Use heat or cold packs.    Take medicine as directed.    Use positions, movements and exercises. Stay active! Too much rest can make " your symptoms worse.  Using heat or cold packs  Try cold packs or gentle heat to ease your pain. Use whichever gives the most relief. Apply the cold pack or heat for 15 minutes at a time, as often as needed.  Taking medicine  If taking over-the-counter medicine:    Take ibuprofen (Advil, Motrin) 600 mg. three times a day as needed for pain.  OR    Take Aleve (naproxen sodium) 220 to 440 mg. two times a day as needed for pain  If your doctor prescribed a muscle relaxant (cyclobenzapine 10 mg.):    Take one half ( ) to 1 tablet at bedtime    Do not drive when taking this medicine. This drug may make you sleepy.  Using positions, movements and exercises  Research tells us that moving your joints and muscles can help you recover from back pain. Such activity should be simple and gentle.  Use the positions below as well as walking to help relieve your discomfort. Try taking a short walk every 3 to 4 hours during the day. Walk for a few minutes inside your home or take longer walks outside, on a treadmill or at a mall. Slowly increase the amount of time you walk. Expect discomfort when you begin, but it should lessen as your back starts to recover.  Finding a position that is comfortable  When your back pain is new, you may find that certain positions will ease your pain. Gently try each of the following positions until you find one that eases your pain. Once you find a position of comfort, use it as often as you like while you recover. Return to your daily routine as soon as possible.     Lie on your back with your legs bent. You can do this by placing a pillow under your knees or lie on the floor and rest your lower legs on the seat of a chair.    Lie on your side with your knees bent and place a pillow between your knees.    Lie on your stomach over pillows.  When should I call my doctor?  Your back pain should improve over the first couple of weeks. As it improves, you should be able to return to your normal  activities. But call your doctor if:    You have a sudden change in your ability to control? your bladder or bowels.    You begin to feel tingling in your groin or legs.    The pain spreads down your leg and into your foot.    Your toes, feet or leg muscles begin to feel weak.    You feel generally unwell or sick.    Your pain gets worse.    2784-9691 [employer]. [address] [place] [address], [place] [address]. All rights reserved. This information is not intended as a substitute for professional medical care. Always follow your healthcare professional's instructions.

## 2017-09-18 NOTE — PROGRESS NOTES
SUBJECTIVE:   Bright Andrew is a 52 year old male who presents to clinic today for the following health issues:  Chief Complaint   Patient presents with     Back Pain     Pt has had some lower back pain for the past couple wks.     Flu Shot       Back Pain       Duration: started 16 days ago        Specific cause: foot was hurting and maybe was overcompensating weight to one side? Patient denies actual fall, hit or injury to back.  Patient denies new activity, sports or heavy lifting just prior to the onset of symptoms.    Description:   Location of pain: low back left  Character of pain: sharp, throbbing and constant  Pain radiation:radiates into the left buttocks and radiates into the left leg, stops past thigh area   New numbness or weakness in legs, not attributed to pain:  no   Patient states after he started having the above symptom [place] long.    Intensity: Currently 5/10, At its worst 7-8/10    History:   Pain interferes with job: YES  History of back problems: recurrent self limited episodes of low back pain in the past, early degenerative disc disease  Any previous MRI or X-rays: Yes- at [place].  Date 2/4/14  Sees a specialist for back pain:  No  Therapies tried without relief: NSAIDs, sitting, standing and stretch; cyclobenzaprine    Alleviating factors:   Improved by: cold and heat - temporary     Precipitating factors:  Worsened by: Sitting - radiates to anterior thigh; crossing legs to put on socks.    Functional and Psychosocial Screen (Wilda STarT Back):      Most recent score:    WILDA START BACK TOTAL SCORE 9/18/2017   Total Score (all 9) 6     Patient states this pain is not as bad as he had in the past but patient states driving to work is very uncomfortable.      Accompanying Signs & Symptoms:  Risk of Fracture:  None  Risk of Cauda Equina:  None  Risk of Infection:  None  Risk of Cancer:  None  Risk of Ankylosing Spondylitis:  Onset at age <35, male, AND  morning back stiffness. no       Problem list and histories reviewed & adjusted, as indicated.  Additional history: as documented    Patient Active Problem List   Diagnosis     Hyperlipidemia LDL goal <130     Screening for hypertension     FH: cardiovascular disease     Lumbago     Overweight (BMI 25.0-29.9)     Past Surgical History:   Procedure Laterality Date     COLONOSCOPY N/A 11/20/2015    Procedure: COLONOSCOPY;  Surgeon: Melquiades Street MD;  Location: [facility]       Social History   Substance Use Topics     Smoking status: Never Smoker     Smokeless tobacco: Never Used     Alcohol use Yes      Comment: few times a week     Family History   Problem Relation Age of Onset     C.A.D. Maternal Grandfather      Cardiovascular Maternal Grandfather      HEART DISEASE Father      congenital heart dz and blockage     CANCER Father      lung         Current Outpatient Prescriptions   Medication Sig Dispense Refill     methylPREDNISolone (MEDROL DOSEPAK) 4 MG tablet Follow package instructions 21 tablet 0     cyclobenzaprine (FLEXERIL) 10 MG tablet Take 1 tablet (10 mg) by mouth 2 times daily as needed for muscle spasms 30 tablet 0     simvastatin (ZOCOR) 20 MG tablet Take 1 tablet (20 mg) by mouth At Bedtime 90 tablet 3     aspirin 81 MG tablet Take 81 mg by mouth daily       fish oil-omega-3 fatty acids (FISH OIL) 1000 MG capsule Take 1 capsule (1 g) by mouth daily 90 capsule prn     loratadine (CLARITIN) 10 MG tablet Take 10 mg by mouth daily as needed.       Ibuprofen (ADVIL) 200 MG capsule Take 200 mg by mouth every 4 hours as needed. Taking 800 mg every 6 hours       No Known Allergies      Reviewed and updated as needed this visit by clinical staffTobacco  Allergies  Meds  Problems  Med Hx  Surg Hx  Fam Hx  Soc Hx        Reviewed and updated as needed this visit by Provider  Allergies  Meds  Problems         ROS:  C: NEGATIVE for fever, chills or change in weight  I: NEGATIVE for worrisome rashes,  "moles or lesions  GI: NEGATIVE for nausea, abdominal pain, heartburn, or change in bowel habits  : NEGATIVE for frequency, dysuria, or hematuria  MUSCULOSKELETAL: see above  N: NEGATIVE for weakness, dizziness or paresthesias  H: NEGATIVE for bleeding problems    OBJECTIVE:                                                    /78  Pulse 83  Temp 97.3  F (36.3  C) (Tympanic)  Ht 6' 2.25\" (1.886 m)  Wt 230 lb (104.3 kg)  BMI 29.33 kg/m2  Body mass index is 29.33 kg/(m^2).  GENERAL:  alert and no distress  NECK: no tenderness, no adenopathy, no asymmetry, no masses, no stiffness  MS: extremities- no gross deformities noted, no edema  SKIN: no suspicious lesions, no rashes  NEURO: strength and tone- normal, sensory exam- grossly normal, reflexes- symmetric  BACK: normal curvature, no deformity, no skin changes, no tendernes on palpation, range of motion full, SLR positive to left anterior thigh at 45 degrees but negative on right    Diagnostic test results:  Diagnostic Test Results:  No results found for this or any previous visit (from the past 24 hour(s)).       ASSESSMENT/PLAN:                                                      ICD-10-CM    1. Lumbago with sciatica, left side M54.42 methylPREDNISolone (MEDROL DOSEPAK) 4 MG tablet     cyclobenzaprine (FLEXERIL) 10 MG tablet  No cauda equina red flags.  Discussed causes, course and possible treatments.  No direct trauma to back; no clear indication for imaging.  Discussed use of the above medications; patient concurred with meds.  Activity modification discussed.  Return precautions discussed and given to patient.     2. Need for prophylactic vaccination and inoculation against influenza Z23 FLU VACCINE, 3 YRS +, IM (QUADRIVALENT W/PRESERVATIVES/MULTI-DOSE) [35991]     Vaccine Administration, Initial [34150]       Follow up with Provider - 1-2 weeks if worse     Patient Instructions   Your history and exam suggest left sided sciatica.    Ibuprofen 200 mg " "1-2 tablets with food every 8 hrs as needed for pain  Cyclobenzaprine as prescribed for spasms. Don't take when driving.  Medrol dose pack as prescribed.  Observe proper lifting techniques.    Warm compress to the back as needed for pain.  Bengay, icy Hot, or Aspercreme topically to the back as needed.  Call clinic if with new symptoms.    See doctor if with incontinence or leg weakness or numbness.   If no improvement after 1-2 weeks, notify clinic.  Possible physical therapy then.      * Sciatica    Sciatica (\"Lumbar Radiculopathy\") causes a pain that spreads from the lower back down into the buttock, hip and leg. Sometimes leg pain can occur without any back pain. Sciatica is due to irritation or pressure on a spinal nerve as it comes out of the spinal canal. This is most often due to a bulge or rupture of a nearby spinal disk (the cartilage cushion between each spinal bone), which presses on a nearby nerve. Other causes include spinal stenosis (narrowing of the spinal canal) and spasm of the piriformis muscle (a muscle in the buttocks that the sciatic nerve passes through).  Sciatica may begin after a sudden twisting/bending force (such as in a car accident), or sometimes after a simple awkward movement. In either case, muscle spasm is commonly present and contributes to the pain.  The diagnosis of sciatica is made from the symptoms and physical exam. Unless you had a physical injury (such as a car accident or fall), X-rays are usually not ordered for the initial evaluation of sciatica because the nerves and disks cannot be seen on an X-ray. Most sciatica (80-90%) gets better with time.  What can I do about my low back pain?  There are three main things you can do to ease low back pain and help it go away.    Use heat or cold packs.    Take medicine as directed.    Use positions, movements and exercises. Stay active! Too much rest can make your symptoms worse.  Using heat or cold packs  Try cold packs or gentle " heat to ease your pain. Use whichever gives the most relief. Apply the cold pack or heat for 15 minutes at a time, as often as needed.  Taking medicine  If taking over-the-counter medicine:    Take ibuprofen (Advil, Motrin) 600 mg. three times a day as needed for pain.  OR    Take Aleve (naproxen sodium) 220 to 440 mg. two times a day as needed for pain  If your doctor prescribed a muscle relaxant (cyclobenzapine 10 mg.):    Take one half ( ) to 1 tablet at bedtime    Do not drive when taking this medicine. This drug may make you sleepy.  Using positions, movements and exercises  Research tells us that moving your joints and muscles can help you recover from back pain. Such activity should be simple and gentle.  Use the positions below as well as walking to help relieve your discomfort. Try taking a short walk every 3 to 4 hours during the day. Walk for a few minutes inside your home or take longer walks outside, on a treadmill or at a mall. Slowly increase the amount of time you walk. Expect discomfort when you begin, but it should lessen as your back starts to recover.  Finding a position that is comfortable  When your back pain is new, you may find that certain positions will ease your pain. Gently try each of the following positions until you find one that eases your pain. Once you find a position of comfort, use it as often as you like while you recover. Return to your daily routine as soon as possible.     Lie on your back with your legs bent. You can do this by placing a pillow under your knees or lie on the floor and rest your lower legs on the seat of a chair.    Lie on your side with your knees bent and place a pillow between your knees.    Lie on your stomach over pillows.  When should I call my doctor?  Your back pain should improve over the first couple of weeks. As it improves, you should be able to return to your normal activities. But call your doctor if:    You have a sudden change in your ability  to control? your bladder or bowels.    You begin to feel tingling in your groin or legs.    The pain spreads down your leg and into your foot.    Your toes, feet or leg muscles begin to feel weak.    You feel generally unwell or sick.    Your pain gets worse.    1714-2480 [employer]. [address] [place] [address]. All rights reserved. This information is not intended as a substitute for professional medical care. Always follow your healthcare professional's instructions.            Moises Reynolds MD  [facility]  Injectable Influenza Immunization Documentation    1.  Is the person to be vaccinated sick today? No    2. Does the person to be vaccinated have an allergy to a component   of the vaccine? No    3. Has the person to be vaccinated ever had a serious reaction   to influenza vaccine in the past? No    4. Has the person to be vaccinated ever had Guillain-Barré syndrome? No    Form completed by Gladis Urbina CMA

## 2017-09-18 NOTE — MR AVS SNAPSHOT
"              After Visit Summary   9/18/2017    Bright Andrew    MRN: [id number]           Patient Information     Date Of Birth          1965        Visit Information        Provider Department      9/18/2017 10:20 AM Moises Reynolds MD [facility]        Today's Diagnoses     Lumbago with sciatica, left side    -  1    Need for prophylactic vaccination and inoculation against influenza          Care Instructions    Your history and exam suggest left sided sciatica.    Ibuprofen 200 mg 1-2 tablets with food every 8 hrs as needed for pain  Cyclobenzaprine as prescribed for spasms. Don't take when driving.  Medrol dose pack as prescribed.  Observe proper lifting techniques.    Warm compress to the back as needed for pain.  Bengay, icy Hot, or Aspercreme topically to the back as needed.  Call clinic if with new symptoms.    See doctor if with incontinence or leg weakness or numbness.   If no improvement after 1-2 weeks, notify clinic.  Possible physical therapy then.      * Sciatica    Sciatica (\"Lumbar Radiculopathy\") causes a pain that spreads from the lower back down into the buttock, hip and leg. Sometimes leg pain can occur without any back pain. Sciatica is due to irritation or pressure on a spinal nerve as it comes out of the spinal canal. This is most often due to a bulge or rupture of a nearby spinal disk (the cartilage cushion between each spinal bone), which presses on a nearby nerve. Other causes include spinal stenosis (narrowing of the spinal canal) and spasm of the piriformis muscle (a muscle in the buttocks that the sciatic nerve passes through).  Sciatica may begin after a sudden twisting/bending force (such as in a car accident), or sometimes after a simple awkward movement. In either case, muscle spasm is commonly present and contributes to the pain.  The diagnosis of sciatica is made from the symptoms and physical exam. Unless you had a physical injury (such as a car " accident or fall), X-rays are usually not ordered for the initial evaluation of sciatica because the nerves and disks cannot be seen on an X-ray. Most sciatica (80-90%) gets better with time.  What can I do about my low back pain?  There are three main things you can do to ease low back pain and help it go away.    Use heat or cold packs.    Take medicine as directed.    Use positions, movements and exercises. Stay active! Too much rest can make your symptoms worse.  Using heat or cold packs  Try cold packs or gentle heat to ease your pain. Use whichever gives the most relief. Apply the cold pack or heat for 15 minutes at a time, as often as needed.  Taking medicine  If taking over-the-counter medicine:    Take ibuprofen (Advil, Motrin) 600 mg. three times a day as needed for pain.  OR    Take Aleve (naproxen sodium) 220 to 440 mg. two times a day as needed for pain  If your doctor prescribed a muscle relaxant (cyclobenzapine 10 mg.):    Take one half ( ) to 1 tablet at bedtime    Do not drive when taking this medicine. This drug may make you sleepy.  Using positions, movements and exercises  Research tells us that moving your joints and muscles can help you recover from back pain. Such activity should be simple and gentle.  Use the positions below as well as walking to help relieve your discomfort. Try taking a short walk every 3 to 4 hours during the day. Walk for a few minutes inside your home or take longer walks outside, on a treadmill or at a mall. Slowly increase the amount of time you walk. Expect discomfort when you begin, but it should lessen as your back starts to recover.  Finding a position that is comfortable  When your back pain is new, you may find that certain positions will ease your pain. Gently try each of the following positions until you find one that eases your pain. Once you find a position of comfort, use it as often as you like while you recover. Return to your daily routine as soon as  possible.     Lie on your back with your legs bent. You can do this by placing a pillow under your knees or lie on the floor and rest your lower legs on the seat of a chair.    Lie on your side with your knees bent and place a pillow between your knees.    Lie on your stomach over pillows.  When should I call my doctor?  Your back pain should improve over the first couple of weeks. As it improves, you should be able to return to your normal activities. But call your doctor if:    You have a sudden change in your ability to control? your bladder or bowels.    You begin to feel tingling in your groin or legs.    The pain spreads down your leg and into your foot.    Your toes, feet or leg muscles begin to feel weak.    You feel generally unwell or sick.    Your pain gets worse.    7561-6477 [employer]. [address] [place] [address]. All rights reserved. This information is not intended as a substitute for professional medical care. Always follow your healthcare professional's instructions.                Follow-ups after your visit        Who to contact     If you have questions or need follow up information about today's clinic visit or your schedule please contact [facility] directly at [phone].  Normal or non-critical lab and imaging results will be communicated to you by CiDRAhart, letter or phone within 4 business days after the clinic has received the results. If you do not hear from us within 7 days, please contact the clinic through CiDRAhart or phone. If you have a critical or abnormal lab result, we will notify you by phone as soon as possible.  Submit refill requests through Thumbs Up or call your pharmacy and they will forward the refill request to us. Please allow 3 business days for your refill to be completed.          Additional Information About Your Visit        Thumbs Up Information     Thumbs Up gives you secure access to your electronic health record. If you see  "a primary care provider, you can also send messages to your care team and make appointments. If you have questions, please call your primary care clinic.  If you do not have a primary care provider, please call [phone] and they will assist you.        Care EveryWhere ID     This is your Care EveryWhere ID. This could be used by other organizations to access your [facility] medical records  [id number]        Your Vitals Were     Pulse Temperature Height BMI (Body Mass Index)          83 97.3  F (36.3  C) (Tympanic) 6' 2.25\" (1.886 m) 29.33 kg/m2         Blood Pressure from Last 3 Encounters:   09/18/17 137/78   02/22/17 108/78   08/29/16 128/80    Weight from Last 3 Encounters:   09/18/17 230 lb (104.3 kg)   02/22/17 223 lb 6.4 oz (101.3 kg)   06/09/16 220 lb (99.8 kg)              We Performed the Following     FLU VACCINE, 3 YRS +, IM (QUADRIVALENT W/PRESERVATIVES/MULTI-DOSE) [59190]     Vaccine Administration, Initial [27436]          Today's Medication Changes          These changes are accurate as of: 9/18/17 11:05 AM.  If you have any questions, ask your nurse or doctor.               Start taking these medicines.        Dose/Directions    cyclobenzaprine 10 MG tablet   Commonly known as:  FLEXERIL   Used for:  Lumbago with sciatica, left side   Started by:  Moises Reynolds MD        Dose:  10 mg   Take 1 tablet (10 mg) by mouth 2 times daily as needed for muscle spasms   Quantity:  30 tablet   Refills:  0       methylPREDNISolone 4 MG tablet   Commonly known as:  MEDROL DOSEPAK   Used for:  Lumbago with sciatica, left side   Started by:  Moises Reynolds MD        Follow package instructions   Quantity:  21 tablet   Refills:  0            Where to get your medicines      These medications were sent to [facility] [address] [facility]  [address] [facility] [address]     Phone:  [phone]     cyclobenzaprine 10 MG tablet    methylPREDNISolone 4 MG " tablet                Primary Care Provider Office Phone # Fax #    Moises Reynolds [phone] [address] [facility] [address]        Equal Access to Services     MIKO BAUTISTA : Hadii aad ku hadkingso Joe, waaxda luqadaha, qaybta kaalmada myriam, franck eloisein hayaafarrah hernandezluz maria ferreira mary ann summers. So [facility] [phone].    ATENCIÓN: Si habla español, tiene a dozier disposición servicios gratuitos de asistencia lingüística. Llame al [phone].    We comply with applicable federal civil rights laws and [place] laws. We do not discriminate on the basis of race, color, national origin, age, disability sex, sexual orientation or gender identity.            Thank you!     Thank you for choosing [facility]  for your care. Our goal is always to provide you with excellent care. Hearing back from our patients is one way we can continue to improve our services. Please take a few minutes to complete the written survey that you may receive in the mail after your visit with us. Thank you!             Your Updated Medication List - Protect others around you: Learn how to safely use, store and throw away your medicines at www.disposemymeds.org.          This list is accurate as of: 9/18/17 11:05 AM.  Always use your most recent med list.                   Brand Name Dispense Instructions for use Diagnosis    ADVIL 200 MG capsule   Generic drug:  ibuprofen      Take 200 mg by mouth every 4 hours as needed. Taking 800 mg every 6 hours        aspirin 81 MG tablet      Take 81 mg by mouth daily        cyclobenzaprine 10 MG tablet    FLEXERIL    30 tablet    Take 1 tablet (10 mg) by mouth 2 times daily as needed for muscle spasms    Lumbago with sciatica, left side       fish oil-omega-3 fatty acids 1000 MG capsule     90 capsule    Take 1 capsule (1 g) by mouth daily    Hyperlipidemia LDL goal <130, FH: cardiovascular disease       loratadine 10 MG tablet    CLARITIN      Take 10 mg by mouth daily as needed.        methylPREDNISolone 4 MG tablet    MEDROL DOSEPAK    21 tablet    Follow package instructions    Lumbago with sciatica, left side       simvastatin 20 MG tablet    ZOCOR    90 tablet    Take 1 tablet (20 mg) by mouth At Bedtime    Hyperlipidemia LDL goal <130

## 2017-12-18 ENCOUNTER — OFFICE VISIT (OUTPATIENT)
Dept: DERMATOLOGY | Facility: CLINIC | Age: 52
End: 2017-12-18
Payer: COMMERCIAL

## 2017-12-18 VITALS — HEART RATE: 73 BPM | TEMPERATURE: 97.1 F | SYSTOLIC BLOOD PRESSURE: 135 MMHG | DIASTOLIC BLOOD PRESSURE: 83 MMHG

## 2017-12-18 DIAGNOSIS — L82.1 SK (SEBORRHEIC KERATOSIS): ICD-10-CM

## 2017-12-18 DIAGNOSIS — I78.1 TELANGIECTASIA: Primary | ICD-10-CM

## 2017-12-18 DIAGNOSIS — L57.0 AK (ACTINIC KERATOSIS): ICD-10-CM

## 2017-12-18 DIAGNOSIS — L81.4 LENTIGO: ICD-10-CM

## 2017-12-18 PROCEDURE — 96999 UNLISTED SPEC DERM SVC/PX: CPT | Performed by: DERMATOLOGY

## 2017-12-18 PROCEDURE — 99214 OFFICE O/P EST MOD 30 MIN: CPT | Mod: 25 | Performed by: DERMATOLOGY

## 2017-12-18 PROCEDURE — 17000 DESTRUCT PREMALG LESION: CPT | Performed by: DERMATOLOGY

## 2017-12-18 PROCEDURE — 17003 DESTRUCT PREMALG LES 2-14: CPT | Performed by: DERMATOLOGY

## 2017-12-18 NOTE — PROGRESS NOTES
Bright Andrew is a 52 year old year old male patient here today for rough spot on right ear.   .  Patient states this has been present for a while.  Patient reports the following symptoms:  Scale.  .  Patient reports the following previous treatments none.  Patient reports the following modifying factors none.  Associated symptoms: none.  Patient has no other skin complaints today.  Remainder of the HPI, Meds, PMH, Allergies, FH, and SH was reviewed in chart.  He also notes red vessels on face.      Past Medical History:   Diagnosis Date     Cellulitis and abscess of unspecified site     recurrent      Chest pain, unspecified        Past Surgical History:   Procedure Laterality Date     COLONOSCOPY N/A 11/20/2015    Procedure: COLONOSCOPY;  Surgeon: Melquiades Street MD;  Location: [facility]        Family History   Problem Relation Age of Onset     C.A.D. Maternal Grandfather      Cardiovascular Maternal Grandfather      HEART DISEASE Father      congenital heart dz and blockage     CANCER Father      lung     Skin Cancer Father      Skin Cancer Mother        Social History     Social History     Marital status:      Spouse name: N/A     Number of children: N/A     Years of education: N/A     Occupational History     Not on file.     Social History Main Topics     Smoking status: Never Smoker     Smokeless tobacco: Never Used     Alcohol use Yes      Comment: few times a week     Drug use: No     Sexual activity: Yes     Partners: Female     Other Topics Concern     Parent/Sibling W/ Cabg, Mi Or Angioplasty Before 65f 55m? No     Social History Narrative       Outpatient Encounter Prescriptions as of 12/18/2017   Medication Sig Dispense Refill     simvastatin (ZOCOR) 20 MG tablet Take 1 tablet (20 mg) by mouth At Bedtime 90 tablet 3     aspirin 81 MG tablet Take 81 mg by mouth daily       fish oil-omega-3 fatty acids (FISH OIL) 1000 MG capsule Take 1 capsule (1 g) by mouth daily 90 capsule prn      cyclobenzaprine (FLEXERIL) 10 MG tablet Take 1 tablet (10 mg) by mouth 2 times daily as needed for muscle spasms (Patient not taking: Reported on 12/18/2017) 30 tablet 0     [DISCONTINUED] methylPREDNISolone (MEDROL DOSEPAK) 4 MG tablet Follow package instructions 21 tablet 0     loratadine (CLARITIN) 10 MG tablet Take 10 mg by mouth daily as needed.       Ibuprofen (ADVIL) 200 MG capsule Take 200 mg by mouth every 4 hours as needed. Taking 800 mg every 6 hours       No facility-administered encounter medications on file as of 12/18/2017.              Review Of Systems  Skin: As above  Eyes: negative  Ears/Nose/Throat: negative  Respiratory: No shortness of breath, dyspnea on exertion, cough, or hemoptysis  Cardiovascular: negative  Gastrointestinal: negative  Genitourinary: negative  Musculoskeletal: negative  Neurologic: negative  Psychiatric: negative  Hematologic/Lymphatic/Immunologic: negative  Endocrine: negative      O:   NAD, WDWN, Alert & Oriented, Mood & Affect wnl, Vitals stable   Here today alone   /83  Pulse 73  Temp 97.1  F (36.2  C) (Tympanic)   General appearance normal   Vitals stable   Alert, oriented and in no acute distress      Following lymph nodes palpated: Occipital, Cervical, Supraclavicular no lad   Stuck on papules and brown macules on trunk and ext    R helix 3 gritty papules   telangietcasia on face        The remainder of expanded problem focused exam was unremarkable; the following areas were examined:  scalp/hair, conjunctiva/lids, face, neck, lipsdigits/nails, RUE, LUE.      Eyes: Conjunctivae/lids:Normal     ENT: Lips, buccal mucosa, tongue: normal    MSK:Normal    Cardiovascular: peripheral edema none    Pulm: Breathing Normal    Lymph Nodes: No Head and Neck Lymphadenopathy     Neuro/Psych: Orientation:Normal; Mood/Affect:Normal      A/P:  1. Actinic keratosis x3  Pathophysiology discussed with pateint   LN2:  Treated with LN2 for 5s for 1-2 cycles. Warned risks of  blistering, pain, pigment change, scarring, and incomplete resolution.  Advised patient to return if lesions do not completely resolve.  Wound care sheet given.  2. Seborrheic keratosis, lentigo  3. Telangiectasia   BENIGN LESIONS DISCUSSED WITH PATIENT:  I discussed the specifics of tumor, prognosis, and genetics of benign lesions.  I explained that treatment of these lesions would be purely cosmetic and not medically neccessary.  I discussed with patient different removal options including excision, cautery and /or laser.      Nature and genetics of benign skin lesions dicussed with patient.  Signs and Symptoms of skin cancer discussed with patient.  Patient encouraged to perform monthly skin exams.  UV precautions reviewed with patient.  Skin care regimen reviewed with patient: Eliminate harsh soaps, i.e. Dial, zest, irsih spring; Mild soaps such as Cetaphil or Dove sensitive skin, avoid hot or cold showers, aggressive use of emollients including vanicream, cetaphil or cerave discussed with patient.    Risks of non-melanoma skin cancer discussed with patient   Return to clinic 6 months    PROCEDURE NOTE  R and L cheek 5 spots  After eye protection had been placed, analgesia obtained with cooling. The ipl was used at a fluence of 20J double pulse 3.5/20  to treat the cheeks..  An excellent clinical response was obtained and the patient confirmed that all sites had been treated.  The patient was given wound care instructions and will return in 4 weeks .

## 2017-12-18 NOTE — LETTER
12/18/2017         RE: Bright Andrew  [address]  [facility] [address]        Dear Colleague,    Thank you for referring your patient, Bright Andrew, to the [facility]. Please see a copy of my visit note below.    Bright Andrew is a 52 year old year old male patient here today for rough spot on right ear.   .  Patient states this has been present for a while.  Patient reports the following symptoms:  Scale.  .  Patient reports the following previous treatments none.  Patient reports the following modifying factors none.  Associated symptoms: none.  Patient has no other skin complaints today.  Remainder of the HPI, Meds, PMH, Allergies, FH, and SH was reviewed in chart.  He also notes red vessels on face.      Past Medical History:   Diagnosis Date     Cellulitis and abscess of unspecified site     recurrent      Chest pain, unspecified        Past Surgical History:   Procedure Laterality Date     COLONOSCOPY N/A 11/20/2015    Procedure: COLONOSCOPY;  Surgeon: Melquiades Street MD;  Location: [facility]        Family History   Problem Relation Age of Onset     C.A.D. Maternal Grandfather      Cardiovascular Maternal Grandfather      HEART DISEASE Father      congenital heart dz and blockage     CANCER Father      lung     Skin Cancer Father      Skin Cancer Mother        Social History     Social History     Marital status:      Spouse name: N/A     Number of children: N/A     Years of education: N/A     Occupational History     Not on file.     Social History Main Topics     Smoking status: Never Smoker     Smokeless tobacco: Never Used     Alcohol use Yes      Comment: few times a week     Drug use: No     Sexual activity: Yes     Partners: Female     Other Topics Concern     Parent/Sibling W/ Cabg, Mi Or Angioplasty Before 65f 55m? No     Social History Narrative       Outpatient Encounter Prescriptions as of 12/18/2017   Medication Sig Dispense Refill     simvastatin  (ZOCOR) 20 MG tablet Take 1 tablet (20 mg) by mouth At Bedtime 90 tablet 3     aspirin 81 MG tablet Take 81 mg by mouth daily       fish oil-omega-3 fatty acids (FISH OIL) 1000 MG capsule Take 1 capsule (1 g) by mouth daily 90 capsule prn     cyclobenzaprine (FLEXERIL) 10 MG tablet Take 1 tablet (10 mg) by mouth 2 times daily as needed for muscle spasms (Patient not taking: Reported on 12/18/2017) 30 tablet 0     [DISCONTINUED] methylPREDNISolone (MEDROL DOSEPAK) 4 MG tablet Follow package instructions 21 tablet 0     loratadine (CLARITIN) 10 MG tablet Take 10 mg by mouth daily as needed.       Ibuprofen (ADVIL) 200 MG capsule Take 200 mg by mouth every 4 hours as needed. Taking 800 mg every 6 hours       No facility-administered encounter medications on file as of 12/18/2017.              Review Of Systems  Skin: As above  Eyes: negative  Ears/Nose/Throat: negative  Respiratory: No shortness of breath, dyspnea on exertion, cough, or hemoptysis  Cardiovascular: negative  Gastrointestinal: negative  Genitourinary: negative  Musculoskeletal: negative  Neurologic: negative  Psychiatric: negative  Hematologic/Lymphatic/Immunologic: negative  Endocrine: negative      O:   NAD, WDWN, Alert & Oriented, Mood & Affect wnl, Vitals stable   Here today alone   /83  Pulse 73  Temp 97.1  F (36.2  C) (Tympanic)   General appearance normal   Vitals stable   Alert, oriented and in no acute distress      Following lymph nodes palpated: Occipital, Cervical, Supraclavicular no lad   Stuck on papules and brown macules on trunk and ext    R helix 3 gritty papules   telangietcasia on face        The remainder of expanded problem focused exam was unremarkable; the following areas were examined:  scalp/hair, conjunctiva/lids, face, neck, lipsdigits/nails, RUE, LUE.      Eyes: Conjunctivae/lids:Normal     ENT: Lips, buccal mucosa, tongue: normal    MSK:Normal    Cardiovascular: peripheral edema none    Pulm: Breathing Normal    Lymph  Nodes: No Head and Neck Lymphadenopathy     Neuro/Psych: Orientation:Normal; Mood/Affect:Normal      A/P:  1. Actinic keratosis x3  Pathophysiology discussed with pateint   LN2:  Treated with LN2 for 5s for 1-2 cycles. Warned risks of blistering, pain, pigment change, scarring, and incomplete resolution.  Advised patient to return if lesions do not completely resolve.  Wound care sheet given.  2. Seborrheic keratosis, lentigo  3. Telangiectasia   BENIGN LESIONS DISCUSSED WITH PATIENT:  I discussed the specifics of tumor, prognosis, and genetics of benign lesions.  I explained that treatment of these lesions would be purely cosmetic and not medically neccessary.  I discussed with patient different removal options including excision, cautery and /or laser.      Nature and genetics of benign skin lesions dicussed with patient.  Signs and Symptoms of skin cancer discussed with patient.  Patient encouraged to perform monthly skin exams.  UV precautions reviewed with patient.  Skin care regimen reviewed with patient: Eliminate harsh soaps, i.e. Dial, zest, irsih spring; Mild soaps such as Cetaphil or Dove sensitive skin, avoid hot or cold showers, aggressive use of emollients including vanicream, cetaphil or cerave discussed with patient.    Risks of non-melanoma skin cancer discussed with patient   Return to clinic 6 months    PROCEDURE NOTE  R and L cheek 5 spots  After eye protection had been placed, analgesia obtained with cooling. The ipl was used at a fluence of 20J double pulse 3.5/20  to treat the cheeks..  An excellent clinical response was obtained and the patient confirmed that all sites had been treated.  The patient was given wound care instructions and will return in 4 weeks .        Again, thank you for allowing me to participate in the care of your patient.        Sincerely,        Livan Jackson MD

## 2017-12-18 NOTE — PATIENT INSTRUCTIONS
WOUND CARE INSTRUCTIONS   FOR CRYOSURGERY   This area treated with liquid nitrogen will form a blister. You do not need to bandage the area until after the blister forms and breaks (which may be a few days). When the blister breaks, begin daily dressing changes as follows:   1) Clean and dry the area with tap water using clean Q-tip or sterile gauze pad.   2) Apply Polysporin ointment or Bacitracin ointment over entire wound. Do NOT use Neosporin ointment.   3) Cover the wound with a band-aid or sterile non-stick gauze pad and micropore paper tape.   REPEAT THESE INSTRUCTIONS AT LEAST ONCE A DAY UNTIL THE WOUND HAS COMPLETELY HEALED.   It is an old wives tale that a wound heals better when it is exposed to air and allowed to dry out. The wound will heal faster with a better cosmetic result if it is kept moist with ointment and covered with a bandage.   Do not let the wound dry out.   IMPORTANT INFORMATION ON REVERSE SIDE   Supplies Needed:   *Cotton tipped applicators (Q-tips)   *Polysporin ointment or Bacitracin ointment (NOT NEOSPORIN)   *Band-aids, or non stick gauze pads and micropore paper tape   PATIENT INFORMATION   During the healing process you will notice a number of changes. All wounds develop a small halo of redness surrounding the wound. This means healing is occurring. Severe itching with extensive redness usually indicates sensitivity to the ointment or bandage tape used to dress the wound. You should call our office if this develops.   Swelling and/or discoloration around your surgical site is common, particularly when performed around the eye.   All wounds normally drain. The larger the wound the more drainage there will be. After 7-10 days, you will notice the wound beginning to shrink and new skin will begin to grow. The wound is healed when you can see skin has formed over the entire area. A healed wound has a healthy, shiny look to the surface and is red to dark pink in color to normalize.  Wounds may take approximately 4-6 weeks to heal. Larger wounds may take 6-8 weeks. After the wound is healed you may discontinue dressing changes.   You may experience a sensation of tightness as your wound heals. This is normal and will gradually subside.   Your healed wound may be sensitive to temperature changes. This sensitivity improves with time, but if you re having a lot of discomfort, try to avoid temperature extremes.   Patients frequently experience itching after their wound appears to have healed because of the continue healing under the skin. Plain Vaseline will help relieve the itching.       You have been treated with IPL. The treated area is very delicate and should be treated gently. Many patients develop immediate redness and swelling of the treated area which feels like a sunburn. Occasionally, blisters and crusts may form. Please read and follow these instructions.     Quick warm showers are recommended. If areas are treated other than the facial area, hot baths are not advised for 24 hours.    Cold compresses (ice packs) should be applied immediately after treatment and may reduce postoperative pain and minimize swelling.    Wound care: wash the treated area with a mild soap twice daily. Emollients such as aquaphor, Aloe Vera gel or petrolatum ointments can be soothing when applied 2-3 times daily until healing is complete. If blistering occurs, a topical antibiotic ointment applied twice daily is helpful. A bandage is not required, however, applying a non-stick bandage or band-aid may help protect a particular area from being irritated by clothing or jewelry.    If crusts or scabs develop, be careful not to scratch or pick at the treated area. Allow them to fall off on their own.    Analgesics such as acetaminophen or ibuprofen may be taken if necessary to reduce discomfort. Additional application of ice or cool compresses in the first 24-36 hours may also be helpful.    Make-up may be applied  on the next day unless blistering or crusts developed. Crusts usually disappear within 1 week. A good mineral, non-comedogenic makeup such as Tootie Iredale is recommended.    Any degree of suntan will make the laser treatment less effective and increase your chance of having adverse effects such as blisters and scarring. It is absolutely necessary that you protect the area with a sunscreen that blocks UVA and UVB rays (SPF 15-30) when going outdoors (especially if you require future treatments). This should be done 4 weeks before and after treatment.

## 2017-12-18 NOTE — NURSING NOTE
"Initial /83  Pulse 73  Temp 97.1  F (36.2  C) (Tympanic) Estimated body mass index is 29.33 kg/(m^2) as calculated from the following:    Height as of 9/18/17: 1.886 m (6' 2.25\").    Weight as of 9/18/17: 104.3 kg (230 lb). .    Carissa Dahl LPN    "

## 2017-12-18 NOTE — MR AVS SNAPSHOT
After Visit Summary   12/18/2017    Bright Andrew    MRN: [id number]           Patient Information     Date Of Birth          1965        Visit Information        Provider Department      12/18/2017 1:30 PM Livan Jackson MD [facility]        Care Instructions    WOUND CARE INSTRUCTIONS   FOR CRYOSURGERY   This area treated with liquid nitrogen will form a blister. You do not need to bandage the area until after the blister forms and breaks (which may be a few days). When the blister breaks, begin daily dressing changes as follows:   1) Clean and dry the area with tap water using clean Q-tip or sterile gauze pad.   2) Apply Polysporin ointment or Bacitracin ointment over entire wound. Do NOT use Neosporin ointment.   3) Cover the wound with a band-aid or sterile non-stick gauze pad and micropore paper tape.   REPEAT THESE INSTRUCTIONS AT LEAST ONCE A DAY UNTIL THE WOUND HAS COMPLETELY HEALED.   It is an old wives tale that a wound heals better when it is exposed to air and allowed to dry out. The wound will heal faster with a better cosmetic result if it is kept moist with ointment and covered with a bandage.   Do not let the wound dry out.   IMPORTANT INFORMATION ON REVERSE SIDE   Supplies Needed:   *Cotton tipped applicators (Q-tips)   *Polysporin ointment or Bacitracin ointment (NOT NEOSPORIN)   *Band-aids, or non stick gauze pads and micropore paper tape   PATIENT INFORMATION   During the healing process you will notice a number of changes. All wounds develop a small halo of redness surrounding the wound. This means healing is occurring. Severe itching with extensive redness usually indicates sensitivity to the ointment or bandage tape used to dress the wound. You should call our office if this develops.   Swelling and/or discoloration around your surgical site is common, particularly when performed around the eye.   All wounds normally drain. The larger the wound the  more drainage there will be. After 7-10 days, you will notice the wound beginning to shrink and new skin will begin to grow. The wound is healed when you can see skin has formed over the entire area. A healed wound has a healthy, shiny look to the surface and is red to dark pink in color to normalize. Wounds may take approximately 4-6 weeks to heal. Larger wounds may take 6-8 weeks. After the wound is healed you may discontinue dressing changes.   You may experience a sensation of tightness as your wound heals. This is normal and will gradually subside.   Your healed wound may be sensitive to temperature changes. This sensitivity improves with time, but if you re having a lot of discomfort, try to avoid temperature extremes.   Patients frequently experience itching after their wound appears to have healed because of the continue healing under the skin. Plain Vaseline will help relieve the itching.       You have been treated with IPL. The treated area is very delicate and should be treated gently. Many patients develop immediate redness and swelling of the treated area which feels like a sunburn. Occasionally, blisters and crusts may form. Please read and follow these instructions.     Quick warm showers are recommended. If areas are treated other than the facial area, hot baths are not advised for 24 hours.    Cold compresses (ice packs) should be applied immediately after treatment and may reduce postoperative pain and minimize swelling.    Wound care: wash the treated area with a mild soap twice daily. Emollients such as aquaphor, Aloe Vera gel or petrolatum ointments can be soothing when applied 2-3 times daily until healing is complete. If blistering occurs, a topical antibiotic ointment applied twice daily is helpful. A bandage is not required, however, applying a non-stick bandage or band-aid may help protect a particular area from being irritated by clothing or jewelry.    If crusts or scabs develop, be  careful not to scratch or pick at the treated area. Allow them to fall off on their own.    Analgesics such as acetaminophen or ibuprofen may be taken if necessary to reduce discomfort. Additional application of ice or cool compresses in the first 24-36 hours may also be helpful.    Make-up may be applied on the next day unless blistering or crusts developed. Crusts usually disappear within 1 week. A good mineral, non-comedogenic makeup such as Tootie Iredale is recommended.    Any degree of suntan will make the laser treatment less effective and increase your chance of having adverse effects such as blisters and scarring. It is absolutely necessary that you protect the area with a sunscreen that blocks UVA and UVB rays (SPF 15-30) when going outdoors (especially if you require future treatments). This should be done 4 weeks before and after treatment.            Follow-ups after your visit        Who to contact     If you have questions or need follow up information about today's clinic visit or your schedule please contact [facility] directly at [phone].  Normal or non-critical lab and imaging results will be communicated to you by Tamehart, letter or phone within 4 business days after the clinic has received the results. If you do not hear from us within 7 days, please contact the clinic through Radient Technologiest or phone. If you have a critical or abnormal lab result, we will notify you by phone as soon as possible.  Submit refill requests through eCoast or call your pharmacy and they will forward the refill request to us. Please allow 3 business days for your refill to be completed.          Additional Information About Your Visit        eCoast Information     eCoast gives you secure access to your electronic health record. If you see a primary care provider, you can also send messages to your care team and make appointments. If you have questions, please call your primary care clinic.  If you do not  have a primary care provider, please call [phone] and they will assist you.        Care EveryWhere ID     This is your Care EveryWhere ID. This could be used by other organizations to access your [facility] medical records  [id number]        Your Vitals Were     Pulse Temperature                73 97.1  F (36.2  C) (Tympanic)           Blood Pressure from Last 3 Encounters:   12/18/17 135/83   09/18/17 137/78   02/22/17 108/78    Weight from Last 3 Encounters:   09/18/17 104.3 kg (230 lb)   02/22/17 101.3 kg (223 lb 6.4 oz)   06/09/16 99.8 kg (220 lb)              Today, you had the following     No orders found for display       Primary Care Provider Office Phone # Fax #    Moises Hung Reynolds [phone] [address] [address]        Equal Access to Services     MIKO BAUTISTA : Hadii aad ku hadasho Soerasto, waaxda luqadaha, qaybta kaalmada adeegyada, franck reese . So [facility] [phone].    ATENCIÓN: Si habla español, tiene a dozier disposición servicios gratuitos de asistencia lingüística. Llame al [phone].    We comply with applicable federal civil rights laws and [place] laws. We do not discriminate on the basis of race, color, national origin, age, disability, sex, sexual orientation, or gender identity.            Thank you!     Thank you for choosing [facility]  for your care. Our goal is always to provide you with excellent care. Hearing back from our patients is one way we can continue to improve our services. Please take a few minutes to complete the written survey that you may receive in the mail after your visit with us. Thank you!             Your Updated Medication List - Protect others around you: Learn how to safely use, store and throw away your medicines at www.disposemymeds.org.          This list is accurate as of: 12/18/17  2:08 PM.  Always use your most recent med list.                    Brand Name Dispense Instructions for use Diagnosis    ADVIL 200 MG capsule   Generic drug:  ibuprofen      Take 200 mg by mouth every 4 hours as needed. Taking 800 mg every 6 hours        aspirin 81 MG tablet      Take 81 mg by mouth daily        cyclobenzaprine 10 MG tablet    FLEXERIL    30 tablet    Take 1 tablet (10 mg) by mouth 2 times daily as needed for muscle spasms    Lumbago with sciatica, left side       fish oil-omega-3 fatty acids 1000 MG capsule     90 capsule    Take 1 capsule (1 g) by mouth daily    Hyperlipidemia LDL goal <130, FH: cardiovascular disease       loratadine 10 MG tablet    CLARITIN     Take 10 mg by mouth daily as needed.        simvastatin 20 MG tablet    ZOCOR    90 tablet    Take 1 tablet (20 mg) by mouth At Bedtime    Hyperlipidemia LDL goal <130

## 2018-01-05 ENCOUNTER — OFFICE VISIT (OUTPATIENT)
Dept: FAMILY MEDICINE | Facility: CLINIC | Age: 53
End: 2018-01-05
Payer: COMMERCIAL

## 2018-01-05 VITALS
SYSTOLIC BLOOD PRESSURE: 130 MMHG | TEMPERATURE: 97.7 F | BODY MASS INDEX: 29.52 KG/M2 | HEART RATE: 95 BPM | WEIGHT: 230 LBS | HEIGHT: 74 IN | OXYGEN SATURATION: 96 % | DIASTOLIC BLOOD PRESSURE: 80 MMHG

## 2018-01-05 DIAGNOSIS — J98.01 BRONCHOSPASM: ICD-10-CM

## 2018-01-05 DIAGNOSIS — J06.9 VIRAL URI WITH COUGH: Primary | ICD-10-CM

## 2018-01-05 PROCEDURE — 99214 OFFICE O/P EST MOD 30 MIN: CPT | Performed by: FAMILY MEDICINE

## 2018-01-05 RX ORDER — ALBUTEROL SULFATE 90 UG/1
2 AEROSOL, METERED RESPIRATORY (INHALATION) EVERY 4 HOURS PRN
Qty: 1 INHALER | Refills: 3 | Status: SHIPPED | OUTPATIENT
Start: 2018-01-05 | End: 2019-03-14

## 2018-01-05 NOTE — PROGRESS NOTES
"  SUBJECTIVE:   Bright Andrew is a 52 year old male who presents to clinic today for the following health issues:      ENT Symptoms             Symptoms: cc Present Absent Comment   Fever/Chills   x    Fatigue   x    Muscle Aches   x    Eye Irritation   x    Sneezing   x    Nasal Silver/Drg  x  drainage   Sinus Pressure/Pain   x    Loss of smell   x    Dental pain   x    Sore Throat   x    Swollen Glands   x    Ear Pain/Fullness   x    Cough x x  Persistent cough   Wheeze   x    Chest Pain   x    Shortness of breath  x  SOB at times   Rash       Other         Symptom duration:  9 days   Symptom severity:  moderate-severe   Treatments tried:  OTC cough medication   Contacts:  none       Current Outpatient Prescriptions:      simvastatin (ZOCOR) 20 MG tablet, Take 1 tablet (20 mg) by mouth At Bedtime, Disp: 90 tablet, Rfl: 3     aspirin 81 MG tablet, Take 81 mg by mouth daily, Disp: , Rfl:      fish oil-omega-3 fatty acids (FISH OIL) 1000 MG capsule, Take 1 capsule (1 g) by mouth daily, Disp: 90 capsule, Rfl: prn     loratadine (CLARITIN) 10 MG tablet, Take 10 mg by mouth daily as needed., Disp: , Rfl:      Ibuprofen (ADVIL) 200 MG capsule, Take 200 mg by mouth every 4 hours as needed. Taking 800 mg every 6 hours, Disp: , Rfl:     Patient Active Problem List   Diagnosis     Hyperlipidemia LDL goal <130     Screening for hypertension     FH: cardiovascular disease     Lumbago     Overweight (BMI 25.0-29.9)       Blood pressure 130/80, pulse 95, temperature 97.7  F (36.5  C), temperature source Tympanic, height 6' 2.25\" (1.886 m), weight 230 lb (104.3 kg), SpO2 96 %.    Exam:  GENERAL APPEARANCE: healthy, alert and no distress  EYES: EOMI,  PERRL  HENT: ear canals and TM's normal and nose and mouth without ulcers or lesions  NECK: no adenopathy, no asymmetry, masses, or scars and thyroid normal to palpation  RESP: rhonchi bilateral and expiratory wheezes bilateral  CV: regular rates and rhythm, normal S1 S2, no S3 or S4 " and no murmur, click or rub -  SKIN: no suspicious lesions or rashes  PSYCH: mentation appears normal and affect normal/bright      (J06.9,  B97.89) Viral URI with cough  (primary encounter diagnosis)  Comment:   Plan: use the fluids and symptomatic therapies and avoid contagious exposures.     (J98.01) Bronchospasm  Comment:   Plan: albuterol (PROAIR HFA/PROVENTIL HFA/VENTOLIN         HFA) 108 (90 BASE) MCG/ACT Inhaler        Use the mucus thinners, and Robitussin DM and the Albuterol inhaler as needed, 2 puffs every 3-4 hours. Identify and avoid triggers.     Domenico De La Rosa

## 2018-01-05 NOTE — MR AVS SNAPSHOT
After Visit Summary   1/5/2018    Bright Andrew    MRN: [id number]           Patient Information     Date Of Birth          1965        Visit Information        Provider Department      1/5/2018 8:40 AM Domenico De La Rosa MD [facility]        Today's Diagnoses     Viral URI with cough    -  1    Bronchospasm          Care Instructions          Thank you for choosing [facility].  You may be receiving a survey in the mail from [facility] regarding your visit today.  Please take a few minutes to complete and return the survey to let us know how we are doing.      If you have questions or concerns, please contact us via Zentric or you can contact your care team at [phone].    Our Clinic hours are:  Monday 6:40 am  to 7:00 pm  Tuesday -Friday 6:40 am to 5:00 pm    The [place] outpatient lab hours are:  Monday - Friday 6:10 am to 4:45 pm  Saturdays 7:00 am to 11:00 am  Appointments are required, call [phone]    If you have clinical questions after hours or would like to schedule an appointment,  call the clinic at [phone].    (J06.9,  B97.89) Viral URI with cough  (primary encounter diagnosis)  Comment:   Plan: use the fluids and symptomatic therapies and avoid contagious exposures.     (J98.01) Bronchospasm  Comment:   Plan: albuterol (PROAIR HFA/PROVENTIL HFA/VENTOLIN         HFA) 108 (90 BASE) MCG/ACT Inhaler        Use the mucus thinners, and Robitussin DM and the Albuterol inhaler as needed, 2 puffs every 3-4 hours. Identify and avoid triggers.           Follow-ups after your visit        Who to contact     If you have questions or need follow up information about today's clinic visit or your schedule please contact [facility] directly at [phone].  Normal or non-critical lab and imaging results will be communicated to you by MyChart, letter or phone within 4 business days after the clinic has received the results. If you do not hear from  "us within 7 days, please contact the clinic through CardiaLen or phone. If you have a critical or abnormal lab result, we will notify you by phone as soon as possible.  Submit refill requests through CardiaLen or call your pharmacy and they will forward the refill request to us. Please allow 3 business days for your refill to be completed.          Additional Information About Your Visit        ClassBugharWho What Wear Information     CardiaLen gives you secure access to your electronic health record. If you see a primary care provider, you can also send messages to your care team and make appointments. If you have questions, please call your primary care clinic.  If you do not have a primary care provider, please call [phone] and they will assist you.        Care EveryWhere ID     This is your Care EveryWhere ID. This could be used by other organizations to access your [place] medical records  [id number]        Your Vitals Were     Pulse Temperature Height Pulse Oximetry BMI (Body Mass Index)       95 97.7  F (36.5  C) (Tympanic) 6' 2.25\" (1.886 m) 96% 29.33 kg/m2        Blood Pressure from Last 3 Encounters:   01/05/18 130/80   12/18/17 135/83   09/18/17 137/78    Weight from Last 3 Encounters:   01/05/18 230 lb (104.3 kg)   09/18/17 230 lb (104.3 kg)   02/22/17 223 lb 6.4 oz (101.3 kg)              Today, you had the following     No orders found for display         Today's Medication Changes          These changes are accurate as of: 1/5/18  9:30 AM.  If you have any questions, ask your nurse or doctor.               Start taking these medicines.        Dose/Directions    albuterol 108 (90 BASE) MCG/ACT Inhaler   Commonly known as:  PROAIR HFA/PROVENTIL HFA/VENTOLIN HFA   Used for:  Bronchospasm   Started by:  Domenico De La Rosa MD        Dose:  2 puff   Inhale 2 puffs into the lungs every 4 hours as needed   Quantity:  1 Inhaler   Refills:  3            Where to get your medicines      These medications were sent to " [facility] [place], [place] - [address]  [address] [address]     Phone:  [phone]     albuterol 108 (90 BASE) MCG/ACT Inhaler                Primary Care Provider Office Phone # Fax #    Moises Hung Reynolds [phone]       [address] [address]        Equal Access to Services     MIKO BAUTISTA : Hadii aad ku hadasho Soomaali, waaxda luqadaha, qaybta kaalmada adeegyada, waxay idiin hayaan adeeg khleannesh lamary . So [facility] [phone].    ATENCIÓN: Si habla español, tiene a dozier disposición servicios gratuitos de asistencia lingüística. Luh al [phone].    We comply with applicable federal civil rights laws and [place] laws. We do not discriminate on the basis of race, color, national origin, age, disability, sex, sexual orientation, or gender identity.            Thank you!     Thank you for choosing [facility]  for your care. Our goal is always to provide you with excellent care. Hearing back from our patients is one way we can continue to improve our services. Please take a few minutes to complete the written survey that you may receive in the mail after your visit with us. Thank you!             Your Updated Medication List - Protect others around you: Learn how to safely use, store and throw away your medicines at www.disposemymeds.org.          This list is accurate as of: 1/5/18  9:30 AM.  Always use your most recent med list.                   Brand Name Dispense Instructions for use Diagnosis    ADVIL 200 MG capsule   Generic drug:  ibuprofen      Take 200 mg by mouth every 4 hours as needed. Taking 800 mg every 6 hours        albuterol 108 (90 BASE) MCG/ACT Inhaler    PROAIR HFA/PROVENTIL HFA/VENTOLIN HFA    1 Inhaler    Inhale 2 puffs into the lungs every 4 hours as needed    Bronchospasm       aspirin 81 MG tablet      Take 81 mg by mouth daily        fish oil-omega-3 fatty acids 1000 MG capsule      90 capsule    Take 1 capsule (1 g) by mouth daily    Hyperlipidemia LDL goal <130, FH: cardiovascular disease       loratadine 10 MG tablet    CLARITIN     Take 10 mg by mouth daily as needed.        simvastatin 20 MG tablet    ZOCOR    90 tablet    Take 1 tablet (20 mg) by mouth At Bedtime    Hyperlipidemia LDL goal <130

## 2018-01-05 NOTE — NURSING NOTE
"Chief Complaint   Patient presents with     Cough     cough for 9 days       Initial /80  Pulse 95  Temp 97.7  F (36.5  C) (Tympanic)  Ht 6' 2.25\" (1.886 m)  Wt 230 lb (104.3 kg)  SpO2 96%  BMI 29.33 kg/m2 Estimated body mass index is 29.33 kg/(m^2) as calculated from the following:    Height as of this encounter: 6' 2.25\" (1.886 m).    Weight as of this encounter: 230 lb (104.3 kg).  Medication Reconciliation: complete  "

## 2018-01-05 NOTE — PATIENT INSTRUCTIONS
Thank you for choosing [facility].  You may be receiving a survey in the mail from [facility] regarding your visit today.  Please take a few minutes to complete and return the survey to let us know how we are doing.      If you have questions or concerns, please contact us via MobilePro or you can contact your care team at [phone].    Our Clinic hours are:  Monday 6:40 am  to 7:00 pm  Tuesday -Friday 6:40 am to 5:00 pm    The [place] outpatient lab hours are:  Monday - Friday 6:10 am to 4:45 pm  Saturdays 7:00 am to 11:00 am  Appointments are required, call [phone]    If you have clinical questions after hours or would like to schedule an appointment,  call the clinic at [phone].    (J06.9,  B97.89) Viral URI with cough  (primary encounter diagnosis)  Comment:   Plan: use the fluids and symptomatic therapies and avoid contagious exposures.     (J98.01) Bronchospasm  Comment:   Plan: albuterol (PROAIR HFA/PROVENTIL HFA/VENTOLIN         HFA) 108 (90 BASE) MCG/ACT Inhaler        Use the mucus thinners, and Robitussin DM and the Albuterol inhaler as needed, 2 puffs every 3-4 hours. Identify and avoid triggers.

## 2018-05-17 ENCOUNTER — TELEPHONE (OUTPATIENT)
Dept: FAMILY MEDICINE | Facility: CLINIC | Age: 53
End: 2018-05-17

## 2018-05-17 DIAGNOSIS — E78.5 HYPERLIPIDEMIA LDL GOAL <130: Primary | ICD-10-CM

## 2018-05-17 DIAGNOSIS — Z13.6 SCREENING FOR HYPERTENSION: ICD-10-CM

## 2018-05-17 DIAGNOSIS — M54.50 LUMBAGO: ICD-10-CM

## 2018-05-17 DIAGNOSIS — Z82.49 FH: CARDIOVASCULAR DISEASE: ICD-10-CM

## 2018-05-17 NOTE — TELEPHONE ENCOUNTER
Pt has an appointment for blood draw already set up, is just requesting that the lab orders be placed in his chart so it can be done.               ----- Message -----        From: Bright Andrew        Sent: 5/16/2018   5:30 PM          To: [facility] Reception Pool     Subject: Appointment Request (HM)                               Appointment Request From: Bright Andrew          With Provider: Moises Reynolds MD [-Primary Care Physician-]          Preferred Date Range: Any date 5/16/2018 or later          Preferred Times: Any          Reason: To address the following health maintenance concerns.     Lipid Screen Q5 Yr Male (System Assigned)          Comments:     Can someone      Labs ordered today    Ofe FARIAS Rn

## 2018-05-25 DIAGNOSIS — Z13.6 SCREENING FOR HYPERTENSION: ICD-10-CM

## 2018-05-25 DIAGNOSIS — M54.50 LUMBAGO: ICD-10-CM

## 2018-05-25 DIAGNOSIS — E78.5 HYPERLIPIDEMIA LDL GOAL <130: ICD-10-CM

## 2018-05-25 DIAGNOSIS — Z82.49 FH: CARDIOVASCULAR DISEASE: ICD-10-CM

## 2018-05-25 LAB
ALBUMIN SERPL-MCNC: 4.1 G/DL (ref 3.4–5)
ALP SERPL-CCNC: 78 U/L (ref 40–150)
ALT SERPL W P-5'-P-CCNC: 20 U/L (ref 0–70)
ANION GAP SERPL CALCULATED.3IONS-SCNC: 7 MMOL/L (ref 3–14)
AST SERPL W P-5'-P-CCNC: 16 U/L (ref 0–45)
BILIRUB SERPL-MCNC: 0.8 MG/DL (ref 0.2–1.3)
BUN SERPL-MCNC: 18 MG/DL (ref 7–30)
CALCIUM SERPL-MCNC: 8.8 MG/DL (ref 8.5–10.1)
CHLORIDE SERPL-SCNC: 105 MMOL/L (ref 94–109)
CHOLEST SERPL-MCNC: 176 MG/DL
CO2 SERPL-SCNC: 27 MMOL/L (ref 20–32)
CREAT SERPL-MCNC: 1.12 MG/DL (ref 0.66–1.25)
GFR SERPL CREATININE-BSD FRML MDRD: 69 ML/MIN/1.7M2
GLUCOSE SERPL-MCNC: 91 MG/DL (ref 70–99)
HDLC SERPL-MCNC: 43 MG/DL
LDLC SERPL CALC-MCNC: 116 MG/DL
NONHDLC SERPL-MCNC: 133 MG/DL
POTASSIUM SERPL-SCNC: 3.9 MMOL/L (ref 3.4–5.3)
PROT SERPL-MCNC: 7.6 G/DL (ref 6.8–8.8)
SODIUM SERPL-SCNC: 139 MMOL/L (ref 133–144)
TRIGL SERPL-MCNC: 83 MG/DL

## 2018-05-25 PROCEDURE — 36415 COLL VENOUS BLD VENIPUNCTURE: CPT | Performed by: FAMILY MEDICINE

## 2018-05-25 PROCEDURE — 80053 COMPREHEN METABOLIC PANEL: CPT | Performed by: FAMILY MEDICINE

## 2018-05-25 PROCEDURE — 80061 LIPID PANEL: CPT | Performed by: FAMILY MEDICINE

## 2018-06-20 ENCOUNTER — OFFICE VISIT (OUTPATIENT)
Dept: FAMILY MEDICINE | Facility: CLINIC | Age: 53
End: 2018-06-20
Payer: COMMERCIAL

## 2018-06-20 VITALS
HEART RATE: 75 BPM | BODY MASS INDEX: 28.62 KG/M2 | WEIGHT: 223 LBS | TEMPERATURE: 97.7 F | OXYGEN SATURATION: 94 % | DIASTOLIC BLOOD PRESSURE: 70 MMHG | HEIGHT: 74 IN | RESPIRATION RATE: 12 BRPM | SYSTOLIC BLOOD PRESSURE: 126 MMHG

## 2018-06-20 DIAGNOSIS — Z00.00 ENCOUNTER FOR ROUTINE ADULT HEALTH EXAMINATION WITHOUT ABNORMAL FINDINGS: Primary | ICD-10-CM

## 2018-06-20 DIAGNOSIS — Z11.4 SCREENING FOR HIV (HUMAN IMMUNODEFICIENCY VIRUS): ICD-10-CM

## 2018-06-20 DIAGNOSIS — E78.5 HYPERLIPIDEMIA LDL GOAL <130: ICD-10-CM

## 2018-06-20 PROCEDURE — 99396 PREV VISIT EST AGE 40-64: CPT | Performed by: FAMILY MEDICINE

## 2018-06-20 RX ORDER — SIMVASTATIN 20 MG
20 TABLET ORAL AT BEDTIME
Qty: 90 TABLET | Refills: 3 | Status: SHIPPED | OUTPATIENT
Start: 2018-06-20 | End: 2019-06-03

## 2018-06-20 ASSESSMENT — PAIN SCALES - GENERAL: PAINLEVEL: NO PAIN (0)

## 2018-06-20 NOTE — PATIENT INSTRUCTIONS
Be consistent with low trans fat and saturated fat diet.  Eat food rich in omega-3-fatty acids as you tolerate. (salmon, olive oil)  Eat 5 cups of vegetables, fruits and whole grains per day.  Limit starchy food (white rice, white bread, white pasta, white potatoes) to less than a cup per meal.  Minimize sweets, junk food and fastfood. Limit soda beverages to one serving per day; best to avoid it altogether though.  Exercise: moderate intensity sustained for at least 30 mins per episode, goal of 150 mins per week at least  Combine cardiovascular and resistance exercises.  These exercise recommendations are in addition to your daily activity at work or home.    Repeat labs in a year.    Preventive Health Recommendations  Male Ages 50   64    Yearly exam:             See your health care provider every year in order to  o   Review health changes.   o   Discuss preventive care.    o   Review your medicines if your doctor has prescribed any.     Have a cholesterol test every 5 years, or more frequently if you are at risk for high cholesterol/heart disease.     Have a diabetes test (fasting glucose) every three years. If you are at risk for diabetes, you should have this test more often.     Have a colonoscopy at age 50, or have a yearly FIT test (stool test). These exams will check for colon cancer.      Talk with your health care provider about whether or not a prostate cancer screening test (PSA) is right for you.    You should be tested each year for STDs (sexually transmitted diseases), if you re at risk.     Shots: Get a flu shot each year. Get a tetanus shot every 10 years.     Nutrition:    Eat at least 5 servings of fruits and vegetables daily.     Eat whole-grain bread, whole-wheat pasta and brown rice instead of white grains and rice.     Talk to your provider about Calcium and Vitamin D.     Lifestyle    Exercise for at least 150 minutes a week (30 minutes a day, 5 days a week). This will help you control  your weight and prevent disease.     Limit alcohol to one drink per day.     No smoking.     Wear sunscreen to prevent skin cancer.     See your dentist every six months for an exam and cleaning.     See your eye doctor every 1 to 2 years.

## 2018-06-20 NOTE — MR AVS SNAPSHOT
After Visit Summary   6/20/2018    Bright Andrew    MRN: [id number]           Patient Information     Date Of Birth          1965        Visit Information        Provider Department      6/20/2018 2:20 PM Moises Reynolds MD [facility]        Today's Diagnoses     Encounter for routine adult health examination without abnormal findings    -  1    Hyperlipidemia LDL goal <130        Screening for HIV (human immunodeficiency virus)          Care Instructions    Be consistent with low trans fat and saturated fat diet.  Eat food rich in omega-3-fatty acids as you tolerate. (salmon, olive oil)  Eat 5 cups of vegetables, fruits and whole grains per day.  Limit starchy food (white rice, white bread, white pasta, white potatoes) to less than a cup per meal.  Minimize sweets, junk food and fastfood. Limit soda beverages to one serving per day; best to avoid it altogether though.  Exercise: moderate intensity sustained for at least 30 mins per episode, goal of 150 mins per week at least  Combine cardiovascular and resistance exercises.  These exercise recommendations are in addition to your daily activity at work or home.    Repeat labs in a year.    Preventive Health Recommendations  Male Ages 50 - 64    Yearly exam:             See your health care provider every year in order to  o   Review health changes.   o   Discuss preventive care.    o   Review your medicines if your doctor has prescribed any.     Have a cholesterol test every 5 years, or more frequently if you are at risk for high cholesterol/heart disease.     Have a diabetes test (fasting glucose) every three years. If you are at risk for diabetes, you should have this test more often.     Have a colonoscopy at age 50, or have a yearly FIT test (stool test). These exams will check for colon cancer.      Talk with your health care provider about whether or not a prostate cancer screening test (PSA) is right for  you.    You should be tested each year for STDs (sexually transmitted diseases), if you re at risk.     Shots: Get a flu shot each year. Get a tetanus shot every 10 years.     Nutrition:    Eat at least 5 servings of fruits and vegetables daily.     Eat whole-grain bread, whole-wheat pasta and brown rice instead of white grains and rice.     Talk to your provider about Calcium and Vitamin D.     Lifestyle    Exercise for at least 150 minutes a week (30 minutes a day, 5 days a week). This will help you control your weight and prevent disease.     Limit alcohol to one drink per day.     No smoking.     Wear sunscreen to prevent skin cancer.     See your dentist every six months for an exam and cleaning.     See your eye doctor every 1 to 2 years.            Follow-ups after your visit        Follow-up notes from your care team     Return if symptoms worsen or fail to improve.      Future tests that were ordered for you today     Open Future Orders        Priority Expected Expires Ordered    Lipid panel reflex to direct LDL Fasting Routine 5/21/2019 6/20/2019 6/20/2018    **HIV Antigen Antibody Combo FUTURE anytime Routine 6/20/2018 6/20/2019 6/20/2018            Who to contact     If you have questions or need follow up information about today's clinic visit or your schedule please contact [facility] directly at [phone].  Normal or non-critical lab and imaging results will be communicated to you by MyChart, letter or phone within 4 business days after the clinic has received the results. If you do not hear from us within 7 days, please contact the clinic through MyChart or phone. If you have a critical or abnormal lab result, we will notify you by phone as soon as possible.  Submit refill requests through [employer] or call your pharmacy and they will forward the refill request to us. Please allow 3 business days for your refill to be completed.          Additional Information About Your Visit       "  MyChart Information     [employer] gives you secure access to your electronic health record. If you see a primary care provider, you can also send messages to your care team and make appointments. If you have questions, please call your primary care clinic.  If you do not have a primary care provider, please call [phone] and they will assist you.        Care EveryWhere ID     This is your Care EveryWhere ID. This could be used by other organizations to access your [facility] medical records  [id number]        Your Vitals Were     Pulse Temperature Respirations Height Pulse Oximetry BMI (Body Mass Index)    75 97.7  F (36.5  C) (Tympanic) 12 6' 2.25\" (1.886 m) 94% 28.44 kg/m2       Blood Pressure from Last 3 Encounters:   06/20/18 126/70   01/05/18 130/80   12/18/17 135/83    Weight from Last 3 Encounters:   06/20/18 223 lb (101.2 kg)   01/05/18 230 lb (104.3 kg)   09/18/17 230 lb (104.3 kg)                 Where to get your medicines      These medications were sent to [facility] [place] [address]  [address] [address]     Phone:  [phone]     simvastatin 20 MG tablet          Primary Care Provider Office Phone # Fax #    Moises Hung Reynolds [phone]       [address] [address]        Equal Access to Services     MIKO BAUTISTA AH: Hadii alex Diaz, waaxda luqadaha, qaybta kaalmada myriam, franck summers. So [facility] [phone].    ATENCIÓN: Si habla español, tiene a dozier disposición servicios gratuitos de asistencia lingüística. Llame al [phone].    We comply with applicable federal civil rights laws and [place] laws. We do not discriminate on the basis of race, color, national origin, age, disability, sex, sexual orientation, or gender identity.            Thank you!     Thank you for choosing [facility]  for your care. Our goal is always to provide " you with excellent care. Hearing back from our patients is one way we can continue to improve our services. Please take a few minutes to complete the written survey that you may receive in the mail after your visit with us. Thank you!             Your Updated Medication List - Protect others around you: Learn how to safely use, store and throw away your medicines at www.disposemymeds.org.          This list is accurate as of 6/20/18  2:40 PM.  Always use your most recent med list.                   Brand Name Dispense Instructions for use Diagnosis    ADVIL 200 MG capsule   Generic drug:  ibuprofen      Take 200 mg by mouth every 4 hours as needed. Taking 800 mg every 6 hours        albuterol 108 (90 Base) MCG/ACT Inhaler    PROAIR HFA/PROVENTIL HFA/VENTOLIN HFA    1 Inhaler    Inhale 2 puffs into the lungs every 4 hours as needed    Bronchospasm       aspirin 81 MG tablet      Take 81 mg by mouth daily        fish oil-omega-3 fatty acids 1000 MG capsule     90 capsule    Take 1 capsule (1 g) by mouth daily    Hyperlipidemia LDL goal <130, FH: cardiovascular disease       loratadine 10 MG tablet    CLARITIN     Take 10 mg by mouth daily as needed.        simvastatin 20 MG tablet    ZOCOR    90 tablet    Take 1 tablet (20 mg) by mouth At Bedtime    Hyperlipidemia LDL goal <130

## 2018-06-20 NOTE — PROGRESS NOTES
SUBJECTIVE:   CC: Bright Andrew is an 53 year old male who presents for preventative health visit.     Healthy Habits:  Answers for HPI/ROS submitted by the patient on 6/17/2018   Annual Exam:  Getting at least 3 servings of Calcium per day:: Yes  Bi-annual eye exam:: Yes  Dental care twice a year:: Yes  Sleep apnea or symptoms of sleep apnea:: None  Diet:: Low fat/cholesterol  Frequency of exercise:: 2-3 days/week  Taking medications regularly:: Yes  Medication side effects:: None  Additional concerns today:: No  PHQ-2 Score: 0  Duration of exercise:: 15-30 minutes        Today's PHQ-2 Score:   PHQ-2 ( 1999 [employer]) 6/20/2018 6/17/2018   Q1: Little interest or pleasure in doing things 0 0   Q2: Feeling down, depressed or hopeless 0 0   PHQ-2 Score 0 0   Q1: Little interest or pleasure in doing things - Not at all   Q2: Feeling down, depressed or hopeless - Not at all   PHQ-2 Score - 0     Abuse: Current or Past(Physical, Sexual or Emotional)- No  Do you feel safe in your environment - Yes    Social History   Substance Use Topics     Smoking status: Never Smoker     Smokeless tobacco: Never Used     Alcohol use Yes      Comment: few times a week      If you drink alcohol do you typically have >3 drinks per day or >7 drinks per week? No                      Last PSA:   PSA   Date Value Ref Range Status   01/21/2015 1.02 0 - 4 ug/L Final     Reviewed orders with patient. Reviewed health maintenance and updated orders accordingly - Yes  BP Readings from Last 3 Encounters:   06/20/18 126/70   01/05/18 130/80   12/18/17 135/83    Wt Readings from Last 3 Encounters:   06/20/18 223 lb (101.2 kg)   01/05/18 230 lb (104.3 kg)   09/18/17 230 lb (104.3 kg)                  Patient Active Problem List   Diagnosis     Hyperlipidemia LDL goal <130     Screening for hypertension     FH: cardiovascular disease     Lumbago     Overweight (BMI 25.0-29.9)     Past Surgical History:   Procedure Laterality Date     COLONOSCOPY N/A  11/20/2015    Procedure: COLONOSCOPY;  Surgeon: Melquiades Street MD;  Location: [facility]       Social History   Substance Use Topics     Smoking status: Never Smoker     Smokeless tobacco: Never Used     Alcohol use Yes      Comment: few times a week     Family History   Problem Relation Age of Onset     C.A.D. Maternal Grandfather      Cardiovascular Maternal Grandfather      HEART DISEASE Father      congenital heart dz and blockage     Cancer Father      lung     Skin Cancer Father      Skin Cancer Mother          Current Outpatient Prescriptions   Medication Sig Dispense Refill     aspirin 81 MG tablet Take 81 mg by mouth daily       fish oil-omega-3 fatty acids (FISH OIL) 1000 MG capsule Take 1 capsule (1 g) by mouth daily 90 capsule prn     Ibuprofen (ADVIL) 200 MG capsule Take 200 mg by mouth every 4 hours as needed. Taking 800 mg every 6 hours       loratadine (CLARITIN) 10 MG tablet Take 10 mg by mouth daily as needed.       simvastatin (ZOCOR) 20 MG tablet Take 1 tablet (20 mg) by mouth At Bedtime 90 tablet 3     albuterol (PROAIR HFA/PROVENTIL HFA/VENTOLIN HFA) 108 (90 BASE) MCG/ACT Inhaler Inhale 2 puffs into the lungs every 4 hours as needed (Patient not taking: Reported on 6/20/2018) 1 Inhaler 3     [DISCONTINUED] simvastatin (ZOCOR) 20 MG tablet Take 1 tablet (20 mg) by mouth At Bedtime 90 tablet 3     No Known Allergies    Reviewed and updated as needed this visit by clinical staff  Tobacco  Allergies  Meds  Problems  Med Hx  Surg Hx  Fam Hx  Soc Hx          Reviewed and updated as needed this visit by Provider  Allergies  Meds  Problems        Past Medical History:   Diagnosis Date     Cellulitis and abscess of unspecified site     recurrent      Chest pain, unspecified       Past Surgical History:   Procedure Laterality Date     COLONOSCOPY N/A 11/20/2015    Procedure: COLONOSCOPY;  Surgeon: Melquiades Street MD;  Location: [facility]       ROS:  CONSTITUTIONAL: NEGATIVE for fever,  "chills, change in weight  INTEGUMENTARY/SKIN: NEGATIVE for worrisome rashes, moles or lesions  EYES: NEGATIVE for vision changes or irritation  ENT: NEGATIVE for ear, mouth and throat problems  RESP: NEGATIVE for significant cough or SOB  CV: NEGATIVE for chest pain, palpitations or peripheral edema  GI: NEGATIVE for nausea, abdominal pain, heartburn, or change in bowel habits   male: negative for dysuria, hematuria, decreased urinary stream, erectile dysfunction, urethral discharge  MUSCULOSKELETAL: NEGATIVE for significant arthralgias or myalgia  NEURO: NEGATIVE for weakness, dizziness or paresthesias  ENDOCRINE: NEGATIVE for temperature intolerance, skin/hair changes  HEME/ALLERGY/IMMUNE: NEGATIVE for bleeding problems  PSYCHIATRIC: NEGATIVE for changes in mood or affect    OBJECTIVE:   /70 (BP Location: Right arm, Patient Position: Chair, Cuff Size: Adult Large)  Pulse 75  Temp 97.7  F (36.5  C) (Tympanic)  Resp 12  Ht 6' 2.25\" (1.886 m)  Wt 223 lb (101.2 kg)  SpO2 94%  BMI 28.44 kg/m2  EXAM:  GENERAL APPEARANCE:  alert and no distress  EYES: pink conj, no icterus, PERRL, EOMI  HENT: ear canals and TM's normal, nose and mouth without ulcers or lesions, oropharynx clear and oral mucous membranes moist  NECK: no adenopathy, no asymmetry, masses, or scars and thyroid normal to palpation  RESP: lungs clear to auscultation - no rales, rhonchi or wheezes  CV: regular rates and rhythm, normal S1 S2, no S3 or S4, no murmur, click or rub, no peripheral edema and peripheral pulses strong  ABDOMEN: soft, nontender, no hepatosplenomegaly, no masses and bowel sounds normal  RECTAL: deferred  MS: no musculoskeletal defects are noted and gait is age appropriate without ataxia  SKIN: no suspicious lesions or rashes  NEURO: Normal strength and tone, sensory exam grossly normal, mentation intact and speech normal    ASSESSMENT/PLAN:   Bright was seen today for physical.    Diagnoses and all orders for this " "visit:    Encounter for routine adult health examination without abnormal findings    Hyperlipidemia LDL goal <130  -     simvastatin (ZOCOR) 20 MG tablet; Take 1 tablet (20 mg) by mouth At Bedtime  -     Lipid panel reflex to direct LDL Fasting; Future  Reinforced heart healthy lifestyle.    Screening for HIV (human immunodeficiency virus)  -     **HIV Antigen Antibody Combo FUTURE anytime; Future  Offered screening based on current recommendations. Patient concurred to screen.        COUNSELING:  Reviewed preventive health counseling, as reflected in patient instructions       Regular exercise       Healthy diet/nutrition       Vision screening       Hearing screening       Alcohol Use       Family planning       Safe sex practices/STD prevention       HIV screeninx in teen years, 1x in adult years, and at intervals if high risk       Colon cancer screening       Prostate cancer screening       Osteoporosis Prevention/Bone Health       The 10-year ASCVD risk score (Jarrett OROZCO Jr, et al., 2013) is: 4.4%    Values used to calculate the score:      Age: 53 years      Sex: Male      Is Non- : No      Diabetic: No      Tobacco smoker: No      Systolic Blood Pressure: 126 mmHg      Is BP treated: No      HDL Cholesterol: 43 mg/dL      Total Cholesterol: 176 mg/dL    BP Screening:   Last 3 BP Readings:    BP Readings from Last 3 Encounters:   18 126/70   18 130/80   17 135/83       The following was recommended to the patient:  Re-screen BP within a year and recommended lifestyle modifications   reports that he has never smoked. He has never used smokeless tobacco.    Estimated body mass index is 28.44 kg/(m^2) as calculated from the following:    Height as of this encounter: 6' 2.25\" (1.886 m).    Weight as of this encounter: 223 lb (101.2 kg).   Weight management plan: Discussed healthy diet and exercise guidelines and patient will follow up in 12 months in clinic to " re-evaluate.    Counseling Resources:  ATP IV Guidelines  Pooled Cohorts Equation Calculator  FRAX Risk Assessment  ICSI Preventive Guidelines  Dietary Guidelines for Americans, 2010  USDA's MyPlate  ASA Prophylaxis  Lung CA Screening    Moises Reynolds MD  [facility]

## 2018-11-01 ENCOUNTER — TELEPHONE (OUTPATIENT)
Dept: FAMILY MEDICINE | Facility: CLINIC | Age: 53
End: 2018-11-01

## 2018-11-01 NOTE — TELEPHONE ENCOUNTER
Reason for Call:  Other FYI    Detailed comments: pt calling to let us know that he received his flu shot at work and wanted to give us the information to update his chart.    Left arm, [employer] Flu Aval 0.5 IM Lot [id number] Exp May 2019    Phone Number Patient can be reached at: Home number on file [phone] (home)    Best Time: any    Can we leave a detailed message on this number? YES    Call taken on 11/1/2018 at 8:54 AM by Meli Orantes

## 2019-03-14 ENCOUNTER — OFFICE VISIT (OUTPATIENT)
Dept: FAMILY MEDICINE | Facility: CLINIC | Age: 54
End: 2019-03-14
Payer: COMMERCIAL

## 2019-03-14 VITALS
HEIGHT: 74 IN | RESPIRATION RATE: 16 BRPM | OXYGEN SATURATION: 94 % | SYSTOLIC BLOOD PRESSURE: 110 MMHG | WEIGHT: 222.4 LBS | DIASTOLIC BLOOD PRESSURE: 82 MMHG | TEMPERATURE: 98.2 F | HEART RATE: 81 BPM | BODY MASS INDEX: 28.54 KG/M2

## 2019-03-14 DIAGNOSIS — J20.9 ACUTE WHEEZY BRONCHITIS: Primary | ICD-10-CM

## 2019-03-14 PROCEDURE — 99213 OFFICE O/P EST LOW 20 MIN: CPT | Performed by: FAMILY MEDICINE

## 2019-03-14 RX ORDER — ALBUTEROL SULFATE 90 UG/1
2 AEROSOL, METERED RESPIRATORY (INHALATION) EVERY 4 HOURS PRN
Qty: 18 G | Refills: 1 | Status: SHIPPED | OUTPATIENT
Start: 2019-03-14 | End: 2020-07-29

## 2019-03-14 RX ORDER — PREDNISONE 20 MG/1
40 TABLET ORAL DAILY
Qty: 10 TABLET | Refills: 0 | Status: SHIPPED | OUTPATIENT
Start: 2019-03-14 | End: 2019-06-03

## 2019-03-14 ASSESSMENT — MIFFLIN-ST. JEOR: SCORE: 1923.55

## 2019-03-14 NOTE — PATIENT INSTRUCTIONS
Patient Education     Viral or Bacterial Bronchitis with Wheezing (Adult)    Bronchitis is an infection of the air passages. It often occurs during a cold and is usually caused by a virus. Symptoms include cough with mucus (phlegm) and low-grade fever. This illness is contagious during the first few days and is spread through the air by coughing and sneezing, or by direct contact (touching the sick person and then touching your own eyes, nose, or mouth).  If there is a lot of inflammation, air flow is restricted. The air passages may also go into spasm, especially if you have asthma. This causes wheezing and difficulty breathing even in people who do not have asthma.  Bronchitis usually lasts 7 to 14 days. The wheezing should improve with treatment during the first week. An inhaler is often prescribed to relax the air passages and stop wheezing. Antibiotics will be prescribed if your doctor thinks there is also a secondary bacterial infection.  Home care    If symptoms are severe, rest at home for the first 2 to 3 days. When you go back to your usual activities, don't let yourself get too tired.    Dont s'moke. Also avoid being exposed to secondhand smoke.    You may use over-the-counter medicine to control fever or pain, unless another medicine was prescribed. Note: If you have chronic liver or kidney disease or have ever had a stomach ulcer or gastrointestinal bleeding, talk with your healthcare provider before using these medicines. Also talk to your provider if you are taking medicine to prevent blood clots.) Aspirin should never be given to anyone younger than 18 years of age who is ill with a viral infection or fever. It may cause severe liver or brain damage.    Your appetite may be poor, so a light diet is fine. Stay well hydrated by drinking 6 to 8 glasses of fluids per day (such as water, soft drinks, sports drinks, juices, tea, or soup). Extra fluids will help loosen secretions in the nose and  lungs.    Over-the-counter cough, cold, and sore-throat medicines will not shorten the length of the illness, but they may be helpful to reduce symptoms. (Note: Don't use decongestants if you have high blood pressure.)    If you were given an inhaler, use it exactly as directed. If you need to use it more often than prescribed, your condition may be worsening. If this happens, contact your healthcare provider.    If prescribed, finish all antibiotic medicine, even if you are feeling better after only a few days.  Follow-up care  Follow up with your healthcare provider, or as advised. If you had an X-ray or ECG (electrocardiogram), a specialist will review it. You will be notified of any new findings that may affect your care.  If you are age 65 or older, or if you have a chronic lung disease or condition that affects your immune system, or you smoke, ask your healthcare provider about getting a pneumococcal vaccine and a yearly flu shot (influenza vaccine).  When to seek medical advice  Call your healthcare provider right away if any of these occur:    Fever of 100.4 F (38 C) or higher, or as directed by your healthcare provider    Coughing up increasing amounts of colored sputum    Weakness, drowsiness, headache, facial pain, ear pain, or a stiff neck  Call 911  Call 911 if any of these occur.    Coughing up blood    Worsening weakness, drowsiness, headache, or stiff neck    Increased wheezing not helped with medication, shortness of breath, or pain with breathing   Date Last Reviewed: 6/1/2018 2000-2018 [employer]. [address] [place] [address], [place] [address]. All rights reserved. This information is not intended as a substitute for professional medical care. Always follow your healthcare professional's instructions.

## 2019-03-14 NOTE — PROGRESS NOTES
SUBJECTIVE:   Bright Andrew is a 53 year old male who presents to clinic today for the following health issues:  Chief Complaint   Patient presents with     Cough     Pt here for bad cough for past 6 days.       ENT Symptoms             Symptoms: cc Present Absent Comment   Fever/Chills   x    Fatigue  x     Muscle Aches   x    Eye Irritation   x    Sneezing   x    Nasal Silver/Drg  x     Sinus Pressure/Pain  x  little   Loss of smell   x    Dental pain   x    Sore Throat  x     Swollen Glands   x    Ear Pain/Fullness   x    Cough x      Wheeze x   For 5 days now   Chest Pain x   Tight and heavy   Shortness of breath  x     Rash   x    Other         Symptom duration:  6 days   Symptom severity:  moderate   Treatments tried:  dayquil, nyquil, mucinex, robitussin dm - not much relief   Contacts:  none       Verified above history with patient.    Patient was on a cruise 1.5 weeks ago.      Problem list and histories reviewed & adjusted, as indicated.  Additional history: as documented    Patient Active Problem List   Diagnosis     Hyperlipidemia LDL goal <130     Screening for hypertension     FH: cardiovascular disease     Lumbago     Overweight (BMI 25.0-29.9)     Past Surgical History:   Procedure Laterality Date     COLONOSCOPY N/A 11/20/2015    Procedure: COLONOSCOPY;  Surgeon: Melquiades Street MD;  Location: [facility]       Social History     Tobacco Use     Smoking status: Never Smoker     Smokeless tobacco: Never Used   Substance Use Topics     Alcohol use: Yes     Comment: few times a week     Family History   Problem Relation Age of Onset     C.A.D. Maternal Grandfather      Cardiovascular Maternal Grandfather      Heart Disease Father         congenital heart dz and blockage     Cancer Father         lung     Skin Cancer Father      Skin Cancer Mother          Current Outpatient Medications   Medication Sig Dispense Refill     albuterol (PROAIR HFA/PROVENTIL HFA/VENTOLIN HFA) 108 (90 Base) MCG/ACT  "inhaler Inhale 2 puffs into the lungs every 4 hours as needed for shortness of breath / dyspnea or wheezing 18 g 1     aspirin 81 MG tablet Take 81 mg by mouth daily       fish oil-omega-3 fatty acids (FISH OIL) 1000 MG capsule Take 1 capsule (1 g) by mouth daily 90 capsule prn     loratadine (CLARITIN) 10 MG tablet Take 10 mg by mouth daily as needed.       predniSONE (DELTASONE) 20 MG tablet Take 40 mg by mouth daily for 5 days. 10 tablet 0     simvastatin (ZOCOR) 20 MG tablet Take 1 tablet (20 mg) by mouth At Bedtime 90 tablet 3     Ibuprofen (ADVIL) 200 MG capsule Take 200 mg by mouth every 4 hours as needed. Taking 800 mg every 6 hours       No Known Allergies    Reviewed and updated as needed this visit by clinical staff  Tobacco  Allergies  Meds  Med Hx  Surg Hx  Fam Hx  Soc Hx      Reviewed and updated as needed this visit by Provider         ROS:  C: NEGATIVE for fever, chills, change in weight  I: NEGATIVE for worrisome rashes, moles or lesions  E: NEGATIVE for vision changes or irritation  ENT/MOUTH: see above  RESP:as above  CV: NEGATIVE for chest pain, palpitations or peripheral edema  GI: NEGATIVE for nausea, abdominal pain, heartburn, or change in bowel habits  M: NEGATIVE for significant arthralgias or myalgia    OBJECTIVE:                                                    /82   Pulse 81   Temp 98.2  F (36.8  C) (Tympanic)   Resp 16   Ht 1.88 m (6' 2\")   Wt 100.9 kg (222 lb 6.4 oz)   SpO2 94%   BMI 28.55 kg/m    Body mass index is 28.55 kg/m .  GENERAL: well-nourished,  alert and no distress  EYES: pink conjunctivae, no icterus  NECK: moderate cervical adenopathy, nontender  HEENT: tympanic membrane intact and pearly bilaterally, nose with mild congestion, no sinus tenderness, throat moderately erythematous, tonsils grade +1 w/ no exudates, no oral ulcers  RESP: good air entry, equal breath sounds, mild wheezing bilateral mid-lower lungs; no crackles  CV: normal rate, regular " rhythm, normal S1 S2, no S3 or S4 and no murmur  SKIN:  Good turgor, no rashes    Diagnostic test results:  Diagnostic Test Results:  No results found for this or any previous visit (from the past 24 hour(s)).     ASSESSMENT/PLAN:                                                        ICD-10-CM    1. Acute wheezy bronchitis J20.9 predniSONE (DELTASONE) 20 MG tablet     albuterol (PROAIR HFA/PROVENTIL HFA/VENTOLIN HFA) 108 (90 Base) MCG/ACT inhaler     No distress. No sign of bacterial infection.  Advised more consistent with viral URI that caused bronchospasm.  Discussed causes, course and treatment.  Reinforced albuterol use.  Since been 5 days with no relief, started prednisone. Patient had similar episode last year.  Advised return precautions.  Consider PFT if more frequent recurrence.    Follow up with Provider - 2-3 days if worsening   Patient Instructions       Patient Education     Viral or Bacterial Bronchitis with Wheezing (Adult)    Bronchitis is an infection of the air passages. It often occurs during a cold and is usually caused by a virus. Symptoms include cough with mucus (phlegm) and low-grade fever. This illness is contagious during the first few days and is spread through the air by coughing and sneezing, or by direct contact (touching the sick person and then touching your own eyes, nose, or mouth).  If there is a lot of inflammation, air flow is restricted. The air passages may also go into spasm, especially if you have asthma. This causes wheezing and difficulty breathing even in people who do not have asthma.  Bronchitis usually lasts 7 to 14 days. The wheezing should improve with treatment during the first week. An inhaler is often prescribed to relax the air passages and stop wheezing. Antibiotics will be prescribed if your doctor thinks there is also a secondary bacterial infection.  Home care    If symptoms are severe, rest at home for the first 2 to 3 days. When you go back to your usual  activities, don't let yourself get too tired.    Dont s'moke. Also avoid being exposed to secondhand smoke.    You may use over-the-counter medicine to control fever or pain, unless another medicine was prescribed. Note: If you have chronic liver or kidney disease or have ever had a stomach ulcer or gastrointestinal bleeding, talk with your healthcare provider before using these medicines. Also talk to your provider if you are taking medicine to prevent blood clots.) Aspirin should never be given to anyone younger than 18 years of age who is ill with a viral infection or fever. It may cause severe liver or brain damage.    Your appetite may be poor, so a light diet is fine. Stay well hydrated by drinking 6 to 8 glasses of fluids per day (such as water, soft drinks, sports drinks, juices, tea, or soup). Extra fluids will help loosen secretions in the nose and lungs.    Over-the-counter cough, cold, and sore-throat medicines will not shorten the length of the illness, but they may be helpful to reduce symptoms. (Note: Don't use decongestants if you have high blood pressure.)    If you were given an inhaler, use it exactly as directed. If you need to use it more often than prescribed, your condition may be worsening. If this happens, contact your healthcare provider.    If prescribed, finish all antibiotic medicine, even if you are feeling better after only a few days.  Follow-up care  Follow up with your healthcare provider, or as advised. If you had an X-ray or ECG (electrocardiogram), a specialist will review it. You will be notified of any new findings that may affect your care.  If you are age 65 or older, or if you have a chronic lung disease or condition that affects your immune system, or you smoke, ask your healthcare provider about getting a pneumococcal vaccine and a yearly flu shot (influenza vaccine).  When to seek medical advice  Call your healthcare provider right away if any of these occur:    Fever of  100.4 F (38 C) or higher, or as directed by your healthcare provider    Coughing up increasing amounts of colored sputum    Weakness, drowsiness, headache, facial pain, ear pain, or a stiff neck  Call 911  Call 911 if any of these occur.    Coughing up blood    Worsening weakness, drowsiness, headache, or stiff neck    Increased wheezing not helped with medication, shortness of breath, or pain with breathing   Date Last Reviewed: 6/1/2018 2000-2018 [employer]. [address] [place] [address]. All rights reserved. This information is not intended as a substitute for professional medical care. Always follow your healthcare professional's instructions.               Moises Reynolds MD  [facility] [place]

## 2019-05-03 ENCOUNTER — MYC MEDICAL ADVICE (OUTPATIENT)
Dept: FAMILY MEDICINE | Facility: CLINIC | Age: 54
End: 2019-05-03

## 2019-05-03 DIAGNOSIS — Z12.5 SCREENING FOR PROSTATE CANCER: Primary | ICD-10-CM

## 2019-05-03 NOTE — TELEPHONE ENCOUNTER
Routed to CMA team as patient would like to get the shingles vaccine at upcoming physical. Please advise.

## 2019-05-07 NOTE — TELEPHONE ENCOUNTER
S:  Patient sending [facility] message with lab request    B:  Patient has lab draw schedule 5/30/19    A:  Patient is asking if Dr Reynolds can add a prostate screen to his 5/30/19 labs    R:  Routed to provider:  Can patient have lab for prostate screen?    Routed to provider    Ameya Novoa RN

## 2019-05-30 DIAGNOSIS — E78.5 HYPERLIPIDEMIA LDL GOAL <130: ICD-10-CM

## 2019-05-30 DIAGNOSIS — Z12.5 SCREENING FOR PROSTATE CANCER: ICD-10-CM

## 2019-05-30 DIAGNOSIS — Z11.4 SCREENING FOR HIV (HUMAN IMMUNODEFICIENCY VIRUS): ICD-10-CM

## 2019-05-30 LAB
CHOLEST SERPL-MCNC: 194 MG/DL
HDLC SERPL-MCNC: 48 MG/DL
LDLC SERPL CALC-MCNC: 118 MG/DL
NONHDLC SERPL-MCNC: 146 MG/DL
PSA SERPL-ACNC: 1.03 UG/L (ref 0–4)
TRIGL SERPL-MCNC: 139 MG/DL

## 2019-05-30 PROCEDURE — 36415 COLL VENOUS BLD VENIPUNCTURE: CPT | Performed by: FAMILY MEDICINE

## 2019-05-30 PROCEDURE — 87389 HIV-1 AG W/HIV-1&-2 AB AG IA: CPT | Performed by: FAMILY MEDICINE

## 2019-05-30 PROCEDURE — 80061 LIPID PANEL: CPT | Performed by: FAMILY MEDICINE

## 2019-05-30 PROCEDURE — G0103 PSA SCREENING: HCPCS | Performed by: FAMILY MEDICINE

## 2019-05-31 LAB — HIV 1+2 AB+HIV1 P24 AG SERPL QL IA: NONREACTIVE

## 2019-05-31 ASSESSMENT — ENCOUNTER SYMPTOMS
WEAKNESS: 0
EYE PAIN: 0
NAUSEA: 0
CONSTIPATION: 0
FREQUENCY: 0
PALPITATIONS: 0
MYALGIAS: 0
HEMATOCHEZIA: 0
COUGH: 0
CHILLS: 0
PARESTHESIAS: 0
JOINT SWELLING: 0
HEARTBURN: 0
NERVOUS/ANXIOUS: 0
HEADACHES: 0
ARTHRALGIAS: 0
SHORTNESS OF BREATH: 0
HEMATURIA: 0
DIZZINESS: 0
DYSURIA: 0
SORE THROAT: 0
DIARRHEA: 0
FEVER: 0
ABDOMINAL PAIN: 0

## 2019-06-03 ENCOUNTER — OFFICE VISIT (OUTPATIENT)
Dept: FAMILY MEDICINE | Facility: CLINIC | Age: 54
End: 2019-06-03
Payer: COMMERCIAL

## 2019-06-03 VITALS
OXYGEN SATURATION: 98 % | BODY MASS INDEX: 28.57 KG/M2 | HEIGHT: 74 IN | TEMPERATURE: 96.3 F | HEART RATE: 67 BPM | DIASTOLIC BLOOD PRESSURE: 80 MMHG | SYSTOLIC BLOOD PRESSURE: 110 MMHG | WEIGHT: 222.6 LBS

## 2019-06-03 DIAGNOSIS — E78.5 HYPERLIPIDEMIA LDL GOAL <130: ICD-10-CM

## 2019-06-03 DIAGNOSIS — Z00.00 ENCOUNTER FOR ROUTINE ADULT HEALTH EXAMINATION WITHOUT ABNORMAL FINDINGS: Primary | ICD-10-CM

## 2019-06-03 PROCEDURE — 99396 PREV VISIT EST AGE 40-64: CPT | Performed by: FAMILY MEDICINE

## 2019-06-03 RX ORDER — SIMVASTATIN 20 MG
20 TABLET ORAL AT BEDTIME
Qty: 90 TABLET | Refills: 3 | Status: SHIPPED | OUTPATIENT
Start: 2019-06-03 | End: 2020-07-28

## 2019-06-03 ASSESSMENT — PAIN SCALES - GENERAL: PAINLEVEL: NO PAIN (0)

## 2019-06-03 ASSESSMENT — MIFFLIN-ST. JEOR: SCORE: 1919.46

## 2019-06-03 NOTE — PROGRESS NOTES
SUBJECTIVE:   CC: Bright Andrew is an 54 year old male who presents for preventative health visit.     Chief Complaint   Patient presents with     Physical     labs completed 5/30     Medication Request     simvastatin     Healthy Habits:     Getting at least 3 servings of Calcium per day:  Yes    Bi-annual eye exam:  Yes    Dental care twice a year:  Yes    Sleep apnea or symptoms of sleep apnea:  None    Diet:  Low salt and Low fat/cholesterol    Frequency of exercise:  2-3 days/week    Duration of exercise:  15-30 minutes    Taking medications regularly:  Yes    Medication side effects:  None    PHQ-2 Total Score: 0    Additional concerns today:  No     The 10-year ASCVD risk score (Jarrett OROZCO JrTrey, et al., 2013) is: 3.9%    Values used to calculate the score:      Age: 54 years      Sex: Male      Is Non- : No      Diabetic: No      Tobacco smoker: No      Systolic Blood Pressure: 110 mmHg      Is BP treated: No      HDL Cholesterol: 48 mg/dL      Total Cholesterol: 194 mg/dL    Ability to successfully perform activities of daily living: Yes, no assistance needed  Home safety:  none identified   Hearing impairment: none    Today's PHQ-2 Score:   PHQ-2 ( 1999 Pfizer) 6/3/2019   Q1: Little interest or pleasure in doing things 0   Q2: Feeling down, depressed or hopeless 0   PHQ-2 Score 0   Q1: Little interest or pleasure in doing things -   Q2: Feeling down, depressed or hopeless -   PHQ-2 Score -     Abuse: Current or Past(Physical, Sexual or Emotional)- No  Do you feel safe in your environment? Yes    Social History     Tobacco Use     Smoking status: Never Smoker     Smokeless tobacco: Never Used   Substance Use Topics     Alcohol use: Yes     Comment: few times a week     If you drink alcohol do you typically have >3 drinks per day or >7 drinks per week? No    =No flowsheet data found.    Last PSA:   PSA   Date Value Ref Range Status   05/30/2019 1.03 0 - 4 ug/L Final     Comment:      Assay Method:  Chemiluminescence using Siemens Vista analyzer     Reviewed orders with patient. Reviewed health maintenance and updated orders accordingly - Yes  BP Readings from Last 3 Encounters:   06/03/19 110/80   03/14/19 110/82   06/20/18 126/70    Wt Readings from Last 3 Encounters:   06/03/19 101 kg (222 lb 9.6 oz)   03/14/19 100.9 kg (222 lb 6.4 oz)   06/20/18 101.2 kg (223 lb)                  Patient Active Problem List   Diagnosis     Hyperlipidemia LDL goal <130     Screening for hypertension     FH: cardiovascular disease     Lumbago     Overweight (BMI 25.0-29.9)     Past Surgical History:   Procedure Laterality Date     COLONOSCOPY N/A 11/20/2015    Procedure: COLONOSCOPY;  Surgeon: Melquiades Street MD;  Location: [facility]       Social History     Tobacco Use     Smoking status: Never Smoker     Smokeless tobacco: Never Used   Substance Use Topics     Alcohol use: Yes     Comment: few times a week     Family History   Problem Relation Age of Onset     C.A.D. Maternal Grandfather      Cardiovascular Maternal Grandfather      Heart Disease Father         congenital heart dz and blockage     Cancer Father         lung     Skin Cancer Father      Skin Cancer Mother          Current Outpatient Medications   Medication Sig Dispense Refill     albuterol (PROAIR HFA/PROVENTIL HFA/VENTOLIN HFA) 108 (90 Base) MCG/ACT inhaler Inhale 2 puffs into the lungs every 4 hours as needed for shortness of breath / dyspnea or wheezing 18 g 1     aspirin 81 MG tablet Take 81 mg by mouth daily       fish oil-omega-3 fatty acids (FISH OIL) 1000 MG capsule Take 1 capsule (1 g) by mouth daily 90 capsule prn     Ibuprofen (ADVIL) 200 MG capsule Take 200 mg by mouth every 4 hours as needed. Taking 800 mg every 6 hours       loratadine (CLARITIN) 10 MG tablet Take 10 mg by mouth daily as needed.       simvastatin (ZOCOR) 20 MG tablet Take 1 tablet (20 mg) by mouth At Bedtime 90 tablet 3     No Known Allergies    Reviewed and  "updated as needed this visit by clinical staff  Tobacco  Allergies  Meds  Problems  Med Hx  Surg Hx  Fam Hx  Soc Hx          Reviewed and updated as needed this visit by Provider  Tobacco  Allergies  Meds  Problems  Med Hx  Surg Hx  Fam Hx        Past Medical History:   Diagnosis Date     Cellulitis and abscess of unspecified site     recurrent      Chest pain, unspecified       Past Surgical History:   Procedure Laterality Date     COLONOSCOPY N/A 11/20/2015    Procedure: COLONOSCOPY;  Surgeon: Melquiades Street MD;  Location: [facility]     Patient denies BM or urinary changes.  No fam hx of colon cancer.  No fam hx of prostate cancer.    Review of Systems  CONSTITUTIONAL: NEGATIVE for fever, chills, change in weight  INTEGUMENTARY/SKIN: NEGATIVE for worrisome rashes, moles or lesions  EYES: NEGATIVE for vision changes or irritation  ENT: NEGATIVE for ear, mouth and throat problems  RESP: NEGATIVE for significant cough or SOB  CV: NEGATIVE for chest pain, palpitations or peripheral edema  GI: NEGATIVE for nausea, abdominal pain, heartburn, or change in bowel habits   male: negative for dysuria, hematuria, decreased urinary stream, erectile dysfunction, urethral discharge  MUSCULOSKELETAL: NEGATIVE for significant arthralgias or myalgia  NEURO: NEGATIVE for weakness, dizziness or paresthesias  PSYCHIATRIC: NEGATIVE for changes in mood or affect    OBJECTIVE:   /80   Pulse 67   Temp 96.3  F (35.7  C) (Tympanic)   Ht 1.88 m (6' 2\")   Wt 101 kg (222 lb 9.6 oz)   SpO2 98%   BMI 28.58 kg/m      Physical Exam  GENERAL APPEARANCE: overweight, alert and no distress  EYES: pink conj, no icterus, PERRL, EOMI  HENT: ear canals and TM's normal, nose and mouth without ulcers or lesions, oropharynx clear and oral mucous membranes moist  NECK: no adenopathy, no asymmetry, masses, or scars and thyroid normal to palpation  RESP: lungs clear to auscultation - no rales, rhonchi or wheezes  CV: regular " "rates and rhythm, normal S1 S2, no S3 or S4, no murmur, click or rub, no peripheral edema and peripheral pulses strong  ABDOMEN: soft, nontender, no hepatosplenomegaly, no masses and bowel sounds normal  RECTAL: deferred  MS: no musculoskeletal defects are noted and gait is age appropriate without ataxia  SKIN: no suspicious lesions or rashes  NEURO: Normal strength and tone, sensory exam grossly normal, mentation intact and speech normal    Diagnostic Test Results:  Labs reviewed in Epic    ASSESSMENT/PLAN:   Bright was seen today for physical and medication request.    Diagnoses and all orders for this visit:    Encounter for routine adult health examination without abnormal findings    Hyperlipidemia LDL goal <130  -     simvastatin (ZOCOR) 20 MG tablet; Take 1 tablet (20 mg) by mouth At Bedtime  Reinforced heart healthy lifestyle.        COUNSELING:   Reviewed preventive health counseling, as reflected in patient instructions    Estimated body mass index is 28.58 kg/m  as calculated from the following:    Height as of this encounter: 1.88 m (6' 2\").    Weight as of this encounter: 101 kg (222 lb 9.6 oz).     Weight management plan: Discussed healthy diet and exercise guidelines     reports that he has never smoked. He has never used smokeless tobacco.      Counseling Resources:  ATP IV Guidelines  Pooled Cohorts Equation Calculator  FRAX Risk Assessment  ICSI Preventive Guidelines  Dietary Guidelines for Americans, 2010  USDA's MyPlate  ASA Prophylaxis  Lung CA Screening    Mioses Reynolds MD  [facility] [place]  "

## 2019-06-03 NOTE — PATIENT INSTRUCTIONS
Be consistent with low trans fat and saturated fat diet.  Eat food rich in omega-3-fatty acids as you tolerate. (salmon, olive oil)  Eat 5 cups of vegetables, fruits and whole grains per day.  Limit starchy food (white rice, white bread, white pasta, white potatoes) to less than a cup per meal.  Minimize sweets, junk food and fastfood. Limit soda beverages to one serving per day; best to avoid it altogether though.  Exercise: moderate intensity sustained for at least 30 mins per episode, goal of 150 mins per week at least  Combine cardiovascular and resistance exercises.  These exercise recommendations are in addition to your daily activity at work or home.  Work on losing weight if you are above your goal body mass index.    Preventive Health Recommendations  Male Ages 50 - 64    Yearly exam:             See your health care provider every year in order to  o   Review health changes.   o   Discuss preventive care.    o   Review your medicines if your doctor has prescribed any.     Have a cholesterol test every 5 years, or more frequently if you are at risk for high cholesterol/heart disease.     Have a diabetes test (fasting glucose) every three years. If you are at risk for diabetes, you should have this test more often.     Have a colonoscopy at age 50, or have a yearly FIT test (stool test). These exams will check for colon cancer.      Talk with your health care provider about whether or not a prostate cancer screening test (PSA) is right for you.    You should be tested each year for STDs (sexually transmitted diseases), if you re at risk.     Shots: Get a flu shot each year. Get a tetanus shot every 10 years.     Nutrition:    Eat at least 5 servings of fruits and vegetables daily.     Eat whole-grain bread, whole-wheat pasta and brown rice instead of white grains and rice.     Get adequate Calcium and Vitamin D.     Lifestyle    Exercise for at least 150 minutes a week (30 minutes a day, 5 days a week). This  will help you control your weight and prevent disease.     Limit alcohol to one drink per day.     No smoking.     Wear sunscreen to prevent skin cancer.     See your dentist every six months for an exam and cleaning.     See your eye doctor every 1 to 2 years.

## 2019-06-03 NOTE — NURSING NOTE
"Chief Complaint   Patient presents with     Physical     labs completed 5/30     Medication Request     simvastatin       Initial /80   Pulse 67   Temp 96.3  F (35.7  C) (Tympanic)   Ht 1.88 m (6' 2\")   Wt 101 kg (222 lb 9.6 oz)   SpO2 98%   BMI 28.58 kg/m   Estimated body mass index is 28.58 kg/m  as calculated from the following:    Height as of this encounter: 1.88 m (6' 2\").    Weight as of this encounter: 101 kg (222 lb 9.6 oz).    Medication Reconciliation: complete  Shona Buchanan MA  "

## 2019-07-08 ENCOUNTER — TELEPHONE (OUTPATIENT)
Dept: FAMILY MEDICINE | Facility: CLINIC | Age: 54
End: 2019-07-08

## 2019-07-08 NOTE — TELEPHONE ENCOUNTER
Call attempted from the wait list to schedule patient for the shingrix vaccine, left a message for the patient to return the call to the clinic.  Transfer to the team MA when the patient returns the call to review questions and schedule appointment.    Patient's insurance is [employer] (Shingrix should be covered).  Will check when patient returns calls.

## 2019-07-08 NOTE — TELEPHONE ENCOUNTER
Patient called back and was scheduled. Shingrix bagged, labeled with patient's info and placed in fridge on [place]. - Sumit FARIAS CMA

## 2019-07-19 ENCOUNTER — ALLIED HEALTH/NURSE VISIT (OUTPATIENT)
Dept: FAMILY MEDICINE | Facility: CLINIC | Age: 54
End: 2019-07-19
Payer: COMMERCIAL

## 2019-07-19 DIAGNOSIS — Z23 NEED FOR VACCINATION: Primary | ICD-10-CM

## 2019-07-19 PROCEDURE — 90750 HZV VACC RECOMBINANT IM: CPT

## 2019-07-19 PROCEDURE — 90471 IMMUNIZATION ADMIN: CPT

## 2019-07-19 PROCEDURE — 99207 ZZC NO CHARGE NURSE ONLY: CPT

## 2019-07-22 ENCOUNTER — MYC REFILL (OUTPATIENT)
Dept: FAMILY MEDICINE | Facility: CLINIC | Age: 54
End: 2019-07-22

## 2019-07-22 DIAGNOSIS — E78.5 HYPERLIPIDEMIA LDL GOAL <130: ICD-10-CM

## 2019-07-23 NOTE — TELEPHONE ENCOUNTER
"Requested Prescriptions   Pending Prescriptions Disp Refills     simvastatin (ZOCOR) 20 MG tablet 90 tablet 3     Sig: Take 1 tablet (20 mg) by mouth At Bedtime   Last Written Prescription Date:  6/3/19  Last Fill Quantity: 90 tab,  # refills: 3   Last office visit: 7/19/2019 with prescribing provider:  Nurse   Future Office Visit:        Statins Protocol Passed - 7/23/2019  3:09 PM        Passed - LDL on file in past 12 months     Recent Labs   Lab Test 05/30/19  0642   *             Passed - No abnormal creatine kinase in past 12 months     No lab results found.             Passed - Recent (12 mo) or future (30 days) visit within the authorizing provider's specialty     Patient had office visit in the last 12 months or has a visit in the next 30 days with authorizing provider or within the authorizing provider's specialty.  See \"Patient Info\" tab in inbasket, or \"Choose Columns\" in Meds & Orders section of the refill encounter.              Passed - Medication is active on med list        Passed - Patient is age 18 or older          "

## 2019-07-24 RX ORDER — SIMVASTATIN 20 MG
20 TABLET ORAL AT BEDTIME
Qty: 90 TABLET | Refills: 3 | OUTPATIENT
Start: 2019-07-24

## 2019-10-05 ENCOUNTER — HEALTH MAINTENANCE LETTER (OUTPATIENT)
Age: 54
End: 2019-10-05

## 2019-10-27 ENCOUNTER — MYC MEDICAL ADVICE (OUTPATIENT)
Dept: FAMILY MEDICINE | Facility: CLINIC | Age: 54
End: 2019-10-27

## 2019-12-05 ENCOUNTER — ALLIED HEALTH/NURSE VISIT (OUTPATIENT)
Dept: FAMILY MEDICINE | Facility: CLINIC | Age: 54
End: 2019-12-05
Payer: COMMERCIAL

## 2019-12-05 DIAGNOSIS — Z23 NEED FOR VACCINATION: Primary | ICD-10-CM

## 2019-12-05 PROCEDURE — 90471 IMMUNIZATION ADMIN: CPT

## 2019-12-05 PROCEDURE — 90750 HZV VACC RECOMBINANT IM: CPT

## 2019-12-05 PROCEDURE — 99207 ZZC NO CHARGE NURSE ONLY: CPT

## 2019-12-05 NOTE — NURSING NOTE
Prior to immunization administration, verified patients identity using patient s name and date of birth. Please see Immunization Activity for additional information.     Screening Questionnaire for Adult Immunization    Are you sick today?   NO   Do you have allergies to medications, food, a vaccine component or latex?   No   Have you ever had a serious reaction after receiving a vaccination?   No   Do you have a long-term health problem with heart disease, lung disease, asthma, kidney disease, metabolic disease (e.g. diabetes), anemia, or other blood disorder?   No   Do you have cancer, leukemia, HIV/AIDS, or any other immune system problem?   No   In the past 3 months, have you taken medications that affect  your immune system, such as prednisone, other steroids, or anticancer drugs; drugs for the treatment of rheumatoid arthritis, Crohn s disease, or psoriasis; or have you had radiation treatments?   No   Have you had a seizure, or a brain or other nervous system problem?   No   During the past year, have you received a transfusion of blood or blood     products, or been given immune (gamma) globulin or antiviral drug?   No   For women: Are you pregnant or is there a chance you could become        pregnant during the next month?   No   Have you received any vaccinations in the past 4 weeks?   No     Immunization questionnaire answers were all negative.         Patient instructed to remain in clinic for 15 minutes afterwards, and to report any adverse reaction to me immediately.       Screening performed by Sumit Starks CMA on 12/5/2019 at 3:37 PM.

## 2020-02-24 ENCOUNTER — OFFICE VISIT (OUTPATIENT)
Dept: DERMATOLOGY | Facility: CLINIC | Age: 55
End: 2020-02-24
Payer: COMMERCIAL

## 2020-02-24 VITALS — SYSTOLIC BLOOD PRESSURE: 125 MMHG | OXYGEN SATURATION: 95 % | HEART RATE: 66 BPM | DIASTOLIC BLOOD PRESSURE: 80 MMHG

## 2020-02-24 DIAGNOSIS — D18.01 ANGIOMA OF SKIN: ICD-10-CM

## 2020-02-24 DIAGNOSIS — L82.1 SEBORRHEIC KERATOSIS: Primary | ICD-10-CM

## 2020-02-24 DIAGNOSIS — D22.9 NEVUS: ICD-10-CM

## 2020-02-24 DIAGNOSIS — L81.4 LENTIGO: ICD-10-CM

## 2020-02-24 PROCEDURE — 99213 OFFICE O/P EST LOW 20 MIN: CPT | Performed by: PHYSICIAN ASSISTANT

## 2020-02-24 NOTE — PROGRESS NOTES
HPI:   CC: Bright Andrew is a 51 year old male who presents for Full skin cancer screening to rule out skin cancer.   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 Going on a week long cruise next week.   On review of systems, there are no further skin complaints, patient is feeling otherwise well.  See patient intake sheet.  ROS of the following were done and are negative: Constitutional, Eyes, Ears, Nose,   Mouth, Throat, Cardiovascular, Respiratory, GI, Genitourinary, Musculoskeletal,   Psychiatric, Endocrine, Allergic/Immunologic.    HPI, past medical history, social history, allergies, medications reviewed as of February 24, 2020    This document serves as a record of the services and decisions personally performed and made by Neena Wu, MS, PA-C. It was created on her behalf by Oneida Kilpatrick, a trained medical scribe. The creation of this document is based on the provider's statements to the medical scribe.  Oneida Kilpatrick 3:49 PM February 24, 2020    PHYSICAL EXAM:   /80   Pulse 66   SpO2 95%   Skin exam performed as follows: Type 2 skin. Mood appropriate  Alert and Oriented X 3. Well developed, well nourished in no distress.  General appearance: Normal  Head including face: Normal  Eyes: conjunctiva and lids: Normal  Mouth: Lips, mucosa: Normal  Neck: Normal  Chest-breast/axillae: Normal  Back: Normal  Abdomen: Normal  Cardiovascular: Exam of peripheral vascular system by observation for swelling, varicosities, edema: Normal  Genitalia: groin, buttocks: Normal  Extremities: digits/nails (clubbing): Normal  Eccrine and Apocrine glands: Normal  Right  upper extremity: Normal  Left upper extremity: Normal  Right lower extremity: Normal  Left lower extremity: Normal  Skin: Scalp and body hair: See below    Pt deferred exam of breasts, groin, buttocks: No    Other physical findings:  1. Multiple pigmented macules on extremities and trunk  2. Multiple pigmented macules on face, trunk and extremities  3. Multiple vascular papules on trunk, arms and legs  4. Multiple scattered keratotic plaques     Except as noted above, no other signs of skin cancer or melanoma.     ASSESSMENT/PLAN:   Benign Full skin cancer screening today. . Patient with history of none  Advised on monthly self exams and 1 year  Patient Education: Appropriate brochures given.    Multiple benign appearing nevi on arms, legs and trunk. Discussed ABCDEs of melanoma and sunscreen.   Multiple lentigos on arms, legs and trunk. Advised benign, no treatment needed.  Multiple scattered angiomas. Advised benign, no treatment needed.   Seborrheic keratosis on arms, legs and trunk. Advised benign, no treatment needed.        Follow-up: yearly FSE/PRN sooner  CC:   1.) Patient was asked about new and changing moles. YES  2.) Patient received a complete physical skin examination: YES  3.) Patient was counseled to perform a monthly self skin examination: YES  Scribed By: Oneida Kilpatrick Medical Scribe    The information in this document, created by the medical scribe for me, accurately reflects the services I personally performed and the decisions made by me. I have reviewed and approved this document for accuracy prior to leaving the patient care area.  February 24, 2020 3:51 PM      ]Neena Wu MS, PADaneC

## 2020-02-24 NOTE — LETTER
2/24/2020         RE: Bright Andrew  [address] [facility] [address]        Dear Colleague,    Thank you for referring your patient, Bright Andrew, to the [facility]. Please see a copy of my visit note below.    HPI:   CC: Bright Andrew is a 51 year old male who presents for Full skin cancer screening to rule out skin cancer.   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 Going on a week long cruise next week.   On review of systems, there are no further skin complaints, patient is feeling otherwise well.  See patient intake sheet.  ROS of the following were done and are negative: Constitutional, Eyes, Ears, Nose,   Mouth, Throat, Cardiovascular, Respiratory, GI, Genitourinary, Musculoskeletal,   Psychiatric, Endocrine, Allergic/Immunologic.    HPI, past medical history, social history, allergies, medications reviewed as of February 24, 2020    This document serves as a record of the services and decisions personally performed and made by Neena Wu, MS, PA-C. It was created on her behalf by Oneida Kilpatrick, a trained medical scribe. The creation of this document is based on the provider's statements to the medical scribe.  Oneida Kilpatrick 3:49 PM February 24, 2020    PHYSICAL EXAM:   /80   Pulse 66   SpO2 95%   Skin exam performed as follows: Type 2 skin. Mood appropriate  Alert and Oriented X 3. Well developed, well nourished in no distress.  General appearance: Normal  Head including face: Normal  Eyes: conjunctiva and lids: Normal  Mouth: Lips, mucosa: Normal  Neck: Normal  Chest-breast/axillae: Normal  Back: Normal  Abdomen:  Normal  Cardiovascular: Exam of peripheral vascular system by observation for swelling, varicosities, edema: Normal  Genitalia: groin, buttocks: Normal  Extremities: digits/nails (clubbing): Normal  Eccrine and Apocrine glands: Normal  Right upper extremity: Normal  Left upper extremity: Normal  Right lower extremity: Normal  Left lower extremity: Normal  Skin: Scalp and body hair: See below    Pt deferred exam of breasts, groin, buttocks: No    Other physical findings:  1. Multiple pigmented macules on extremities and trunk  2. Multiple pigmented macules on face, trunk and extremities  3. Multiple vascular papules on trunk, arms and legs  4. Multiple scattered keratotic plaques     Except as noted above, no other signs of skin cancer or melanoma.     ASSESSMENT/PLAN:   Benign Full skin cancer screening today. . Patient with history of none  Advised on monthly self exams and 1 year  Patient Education: Appropriate brochures given.    Multiple benign appearing nevi on arms, legs and trunk. Discussed ABCDEs of melanoma and sunscreen.   Multiple lentigos on arms, legs and trunk. Advised benign, no treatment needed.  Multiple scattered angiomas. Advised benign, no treatment needed.   Seborrheic keratosis on arms, legs and trunk. Advised benign, no treatment needed.        Follow-up: yearly FSE/PRN sooner  CC:   1.) Patient was asked about new and changing moles. YES  2.) Patient received a complete physical skin examination: YES  3.) Patient was counseled to perform a monthly self skin examination: YES  Scribed By: Oneida Kilpatrick Medical Scribe    The information in this document, created by the medical scribe for me, accurately reflects the services I personally performed and the decisions made by me. I have reviewed and approved this document for accuracy prior to leaving the patient care area.  February 24, 2020 3:51 PM      ]Neena Wu, MS, PA-C        Again, thank you for allowing me to participate in the care of  your patient.        Sincerely,        Neena Mcqueen PA-C

## 2020-07-27 ENCOUNTER — MYC MEDICAL ADVICE (OUTPATIENT)
Dept: FAMILY MEDICINE | Facility: CLINIC | Age: 55
End: 2020-07-27

## 2020-07-27 ENCOUNTER — MYC REFILL (OUTPATIENT)
Dept: FAMILY MEDICINE | Facility: CLINIC | Age: 55
End: 2020-07-27

## 2020-07-27 DIAGNOSIS — E78.5 HYPERLIPIDEMIA LDL GOAL <130: ICD-10-CM

## 2020-07-27 RX ORDER — SIMVASTATIN 20 MG
20 TABLET ORAL AT BEDTIME
Qty: 90 TABLET | Refills: 3 | Status: CANCELLED | OUTPATIENT
Start: 2020-07-27

## 2020-07-28 RX ORDER — SIMVASTATIN 20 MG
20 TABLET ORAL AT BEDTIME
Qty: 30 TABLET | Refills: 0 | Status: SHIPPED | OUTPATIENT
Start: 2020-07-28 | End: 2020-07-29

## 2020-07-28 NOTE — TELEPHONE ENCOUNTER
"Requested Prescriptions   Pending Prescriptions Disp Refills     simvastatin (ZOCOR) 20 MG tablet 90 tablet 3     Sig: Take 1 tablet (20 mg) by mouth At Bedtime       Statins Protocol Failed - 7/28/2020  7:29 AM        Failed - LDL on file in past 12 months     Recent Labs   Lab Test 05/30/19  0642   *             Passed - No abnormal creatine kinase in past 12 months     No lab results found.             Passed - Recent (12 mo) or future (30 days) visit within the authorizing provider's specialty     Patient has had an office visit with the authorizing provider or a provider within the authorizing providers department within the previous 12 mos or has a future within next 30 days. See \"Patient Info\" tab in inbasket, or \"Choose Columns\" in Meds & Orders section of the refill encounter.              Passed - Medication is active on med list        Passed - Patient is age 18 or older             "

## 2020-07-29 ENCOUNTER — OFFICE VISIT (OUTPATIENT)
Dept: FAMILY MEDICINE | Facility: CLINIC | Age: 55
End: 2020-07-29
Payer: COMMERCIAL

## 2020-07-29 VITALS
TEMPERATURE: 96.9 F | SYSTOLIC BLOOD PRESSURE: 126 MMHG | WEIGHT: 216 LBS | RESPIRATION RATE: 16 BRPM | DIASTOLIC BLOOD PRESSURE: 80 MMHG | OXYGEN SATURATION: 97 % | HEART RATE: 65 BPM | HEIGHT: 74 IN | BODY MASS INDEX: 27.72 KG/M2

## 2020-07-29 DIAGNOSIS — Z00.00 ENCOUNTER FOR ROUTINE ADULT HEALTH EXAMINATION WITHOUT ABNORMAL FINDINGS: Primary | ICD-10-CM

## 2020-07-29 DIAGNOSIS — E78.5 HYPERLIPIDEMIA LDL GOAL <130: ICD-10-CM

## 2020-07-29 DIAGNOSIS — E78.2 MIXED HYPERLIPIDEMIA: Primary | ICD-10-CM

## 2020-07-29 DIAGNOSIS — H61.23 BILATERAL IMPACTED CERUMEN: ICD-10-CM

## 2020-07-29 DIAGNOSIS — J98.01 BRONCHOSPASM: ICD-10-CM

## 2020-07-29 DIAGNOSIS — Z12.5 SCREENING FOR PROSTATE CANCER: ICD-10-CM

## 2020-07-29 LAB
CHOLEST SERPL-MCNC: 218 MG/DL
HDLC SERPL-MCNC: 60 MG/DL
LDLC SERPL CALC-MCNC: 138 MG/DL
NONHDLC SERPL-MCNC: 158 MG/DL
PSA SERPL-ACNC: 0.98 UG/L (ref 0–4)
TRIGL SERPL-MCNC: 102 MG/DL

## 2020-07-29 PROCEDURE — 80061 LIPID PANEL: CPT | Performed by: FAMILY MEDICINE

## 2020-07-29 PROCEDURE — 99213 OFFICE O/P EST LOW 20 MIN: CPT | Mod: 25 | Performed by: FAMILY MEDICINE

## 2020-07-29 PROCEDURE — 69210 REMOVE IMPACTED EAR WAX UNI: CPT | Mod: 50 | Performed by: FAMILY MEDICINE

## 2020-07-29 PROCEDURE — G0103 PSA SCREENING: HCPCS | Performed by: FAMILY MEDICINE

## 2020-07-29 PROCEDURE — 99396 PREV VISIT EST AGE 40-64: CPT | Mod: 25 | Performed by: FAMILY MEDICINE

## 2020-07-29 PROCEDURE — 36415 COLL VENOUS BLD VENIPUNCTURE: CPT | Performed by: FAMILY MEDICINE

## 2020-07-29 RX ORDER — ALBUTEROL SULFATE 90 UG/1
2 AEROSOL, METERED RESPIRATORY (INHALATION) EVERY 4 HOURS PRN
Qty: 18 G | Refills: 1 | Status: SHIPPED | OUTPATIENT
Start: 2020-07-29 | End: 2022-07-11

## 2020-07-29 RX ORDER — SIMVASTATIN 20 MG
20 TABLET ORAL AT BEDTIME
Qty: 90 TABLET | Refills: 3 | Status: SHIPPED | OUTPATIENT
Start: 2020-07-29 | End: 2021-08-12

## 2020-07-29 ASSESSMENT — MIFFLIN-ST. JEOR: SCORE: 1884.52

## 2020-07-29 NOTE — PATIENT INSTRUCTIONS
You will be contacted in 1-2 days for results of your lab tests.    Be consistent with low trans fat and saturated fat diet.  Eat food rich in omega-3-fatty acids as you tolerate. (salmon, olive oil)  Eat 5 cups of vegetables, fruits and whole grains per day.  Limit starchy food (white rice, white bread, white pasta, white potatoes) to less than a cup per meal.  Minimize sweets, junk food and fastfood. Limit soda beverages to one serving per day; best to avoid it altogether though.  Exercise: moderate intensity sustained for at least 30 mins per episode, goal of 150 mins per week at least  Combine cardiovascular and resistance exercises.  These exercise recommendations are in addition to your daily activity at work or home.  Work on losing weight if you are above your goal body mass index.    Preventative Care Visits include: Yearly physicals, Well-child visits, Welcome to Medicare visits, & Medicare yearly wellness exams.    The purpose of these visits is to discuss your medical history and prevent health problems before you are sick.  You may need to pay a copay, coinsurance or deductible if your visit today includes testing or treating for a new or existing condition.    Additional charges may be incurred for today's visit. If you have questions about what your insurance plan covers, please contact your Insurance Company's member service department.  If you have questions specific to a bill you have already received from [facility], please contact the [email or web address] Billing Office at [phone].     Preventive Health Recommendations  Male Ages 50 - 64    Yearly exam:             See your health care provider every year in order to  o   Review health changes.   o   Discuss preventive care.    o   Review your medicines if your doctor has prescribed any.     Have a cholesterol test every 5 years, or more frequently if you are at risk for high cholesterol/heart disease.     Have a diabetes test (fasting glucose) every  three years. If you are at risk for diabetes, you should have this test more often.     Have a colonoscopy at age 50, or have a yearly FIT test (stool test). These exams will check for colon cancer.      Talk with your health care provider about whether or not a prostate cancer screening test (PSA) is right for you.    You should be tested each year for STDs (sexually transmitted diseases), if you re at risk.     Shots: Get a flu shot each year. Get a tetanus shot every 10 years.     Nutrition:    Eat at least 5 servings of fruits and vegetables daily.     Eat whole-grain bread, whole-wheat pasta and brown rice instead of white grains and rice.     Get adequate Calcium and Vitamin D.     Lifestyle    Exercise for at least 150 minutes a week (30 minutes a day, 5 days a week). This will help you control your weight and prevent disease.     Limit alcohol to one drink per day.     No smoking.     Wear sunscreen to prevent skin cancer.     See your dentist every six months for an exam and cleaning.     See your eye doctor every 1 to 2 years.    Patient Education       Earwax, Home Treatment    Everyone produces earwax from the lining of the ear canal. It serves to lubricate and protect the ear. The wax that forms in the canal naturally moves toward the outside of the ear and falls out. Sometimes the ear canal may contain too much wax. This can cause a blockage and loss of hearing. Directions are given below for home treatment.  Home care  If your doctor has advised you to remove a wax blockage yourself, follow these directions:    Unless a medicine was prescribed, you may use an over-the-counter product made for clearing earwax. These contain carbamide peroxide. Lie down with the blocked ear facing upward. Apply one dropper full of medicine and wait a few minutes. Grasp the outer ear and wiggle it to help the solution enter the canal.    Lean over a sink or basin with the blocked ear facing downward. Use a bulb syringe  filled with warm (not hot or cold) water to rinse the ear several times. Use gentle pressure only.    If you are having trouble draining the water out of your ear canal, put a few drops of rubbing alcohol (isopropyl alcohol) into the ear canal. This will help remove the remaining water.    Repeat this procedure once a day for up to three days, or until your hearing is back to normal. Do not use this treatment for more than three days in a row.  Don ts    Don t use cold water to rinse the ear. This will make you dizzy.    Don t perform this procedure if you have an ear infection.    Don t perform this procedure if you have a ruptured eardrum.    Don t use cotton swabs, matches, hairpins, keys, or other objects to  clean  the ear canal. This can cause infection of the ear canal or rupture the eardrum. Because of their size and shape, cotton swabs can push earwax deeper into the ear canal instead of removing it.  Follow-up care  Follow up with your health care provider if you are not improving after three cleaning attempts, or as advised.  When to seek medical advice  Call your health care provider right away if any of these occur:    Worsening ear pain    Fever of 101 F (38.3 C) or higher, or as directed by your health care provider    Hearing does not return to normal after three days of treatment    Fluid drainage or bleeding from the ear canal    Swelling, redness, or tenderness of the outer ear    Headache, neck pain, or stiff neck    2352-1376 [employer]. [address] [place] [address], [place] [address]. All rights reserved. This information is not intended as a substitute for professional medical care. Always follow your healthcare professional's instructions.

## 2020-07-29 NOTE — TELEPHONE ENCOUNTER
simvastatin (ZOCOR) 20 MG tablet  90 tablet  3  7/29/2020   No    Sig - Route: Take 1 tablet (20 mg) by mouth At Bedtime Please cancel the 30-day supply Rx. - Oral    Sent to pharmacy as: Simvastatin 20 MG Oral Tablet (ZOCOR)    Class: E-Prescribe    Notes to Pharmacy: Hold until patient calls for refill.    Order: [id number]    E-Prescribing Status: Receipt confirmed by pharmacy (7/29/2020  8:01 AM CDT)

## 2020-07-29 NOTE — PROGRESS NOTES
SUBJECTIVE:   CC: Bright Andrew is an 55 year old male who presents for preventative health visit.     Healthy Habits:    Getting at least 3 servings of Calcium per day:  Yes    Bi-annual eye exam:  Yes    Dental care twice a year:  Yes    Sleep apnea or symptoms of sleep apnea:  None    Diet:  Regular (no restrictions)    Frequency of exercise:  2-3 days/week    Duration of exercise:  45-60 minutes    Taking medications regularly:  Yes    Barriers to taking medications:  None    Medication side effects:  None    PHQ-2 Total Score:    Additional concerns today:  No      Retired recently from law enforcement.    Patient was advised that any concern beyond preventive/wellness screenings or items may incur secondary charges in his medical bill. Patient concurred to proceed with the following:    Wants refill of atorvastatin. deneis ADR to med. Patient denies chest pain, abd pain, dyspnea, jaundice, nausea, or BM changes.    Albuterol has not been used since last year. Gets wheezy sometimes when gets a cold.       Today's PHQ-2 Score:   PHQ-2 ( 1999 [employer]) 7/29/2020   Q1: Little interest or pleasure in doing things 0   Q2: Feeling down, depressed or hopeless 0   PHQ-2 Score 0   Q1: Little interest or pleasure in doing things -   Q2: Feeling down, depressed or hopeless -   PHQ-2 Score -       Abuse: Current or Past(Physical, Sexual or Emotional)- No  Do you feel safe in your environment? No    Have you ever done Advance Care Planning? (For example, a Health Directive, POLST, or a discussion with a medical provider or your loved ones about your wishes): No, advance care planning information given to patient to review.  Advanced care planning was discussed at today's visit.    Social History     Tobacco Use     Smoking status: Never Smoker     Smokeless tobacco: Never Used   Substance Use Topics     Alcohol use: Yes     Comment: few times a week       Last PSA:   PSA   Date Value Ref Range Status   07/29/2020 0.98 0 - 4  ug/L Final     Comment:     Assay Method:  Chemiluminescence using Siemens Vista analyzer       Reviewed orders with patient. Reviewed health maintenance and updated orders accordingly - Yes  Patient Active Problem List   Diagnosis     Hyperlipidemia LDL goal <130     Screening for hypertension     FH: cardiovascular disease     Lumbago     Overweight (BMI 25.0-29.9)     Past Surgical History:   Procedure Laterality Date     COLONOSCOPY N/A 11/20/2015    Procedure: COLONOSCOPY;  Surgeon: Melquiades Street MD;  Location: [facility]       Social History     Tobacco Use     Smoking status: Never Smoker     Smokeless tobacco: Never Used   Substance Use Topics     Alcohol use: Yes     Comment: few times a week     Family History   Problem Relation Age of Onset     C.A.D. Maternal Grandfather      Cardiovascular Maternal Grandfather      Heart Disease Father         congenital heart dz and blockage     Cancer Father         lung     Skin Cancer Father      Skin Cancer Mother          Current Outpatient Medications   Medication Sig Dispense Refill     albuterol (PROAIR HFA/PROVENTIL HFA/VENTOLIN HFA) 108 (90 Base) MCG/ACT inhaler Inhale 2 puffs into the lungs every 4 hours as needed for shortness of breath / dyspnea or wheezing Hold until patient calls for refill. 18 g 1     aspirin 81 MG tablet Take 81 mg by mouth daily Patient takes every other day       fish oil-omega-3 fatty acids (FISH OIL) 1000 MG capsule Take 1 capsule (1 g) by mouth daily 90 capsule prn     loratadine (CLARITIN) 10 MG tablet Take 10 mg by mouth daily as needed.       simvastatin (ZOCOR) 20 MG tablet Take 1 tablet (20 mg) by mouth At Bedtime Please cancel the 30-day supply Rx. 90 tablet 3     Ibuprofen (ADVIL) 200 MG capsule Take 200 mg by mouth every 4 hours as needed. Taking 800 mg every 6 hours       No Known Allergies    Reviewed and updated as needed this visit by clinical staff  Tobacco  Allergies  Meds  Problems  Med Hx  Surg Hx  Fam  "Hx  Soc Hx          Reviewed and updated as needed this visit by Provider  Tobacco  Allergies  Meds  Problems  Med Hx  Surg Hx  Fam Hx        Past Medical History:   Diagnosis Date     Cellulitis and abscess of unspecified site     recurrent      Chest pain, unspecified       Past Surgical History:   Procedure Laterality Date     COLONOSCOPY N/A 11/20/2015    Procedure: COLONOSCOPY;  Surgeon: Melquiades Street MD;  Location: [facility]       Review of Systems  CONSTITUTIONAL: NEGATIVE for fever, chills, change in weight  INTEGUMENTARY/SKIN: NEGATIVE for worrisome rashes, moles or lesions  EYES: NEGATIVE for vision changes or irritation  ENT: NEGATIVE for ear, mouth and throat problems  RESP: NEGATIVE for significant cough or SOB  CV: NEGATIVE for chest pain, palpitations or peripheral edema  GI: NEGATIVE for nausea, abdominal pain, heartburn, or change in bowel habits   male: negative for dysuria, hematuria, decreased urinary stream, erectile dysfunction, urethral discharge  MUSCULOSKELETAL: NEGATIVE for significant arthralgias or myalgia  NEURO: NEGATIVE for weakness, dizziness or paresthesias  ENDOCRINE: NEGATIVE for temperature intolerance, skin/hair changes  HEME/ALLERGY/IMMUNE: NEGATIVE for bleeding problems  PSYCHIATRIC: NEGATIVE for changes in mood or affect    OBJECTIVE:   /80 (BP Location: Right arm, Patient Position: Sitting, Cuff Size: Adult Large)   Pulse 65   Temp 96.9  F (36.1  C) (Tympanic)   Resp 16   Ht 1.88 m (6' 2\")   Wt 98 kg (216 lb)   SpO2 97%   BMI 27.73 kg/m      Physical Exam  GENERAL APPEARANCE: overweight, alert and no distress  EYES: pink conj, no icterus, PERRL, EOMI  HENT: ear canals with bilateral impacted cerumen; TM's visualized after cerumen evacuation - intaact and non-injected, nose and mouth without ulcers or lesions, oropharynx clear and oral mucous membranes moist  NECK: no adenopathy, no asymmetry, masses, or scars and thyroid normal to palpation  RESP: " lungs clear to auscultation - no rales, rhonchi or wheezes  CV: regular rates and rhythm, normal S1 S2, no S3 or S4, no murmur, click or rub, no peripheral edema and peripheral pulses strong  ABDOMEN: soft, nontender, no hepatosplenomegaly, no masses and bowel sounds normal  RECTAL: deferred  MS: no musculoskeletal defects are noted and gait is age appropriate without ataxia  SKIN: no suspicious lesions or rashes  NEURO: Normal strength and tone, sensory exam grossly normal, mentation intact and speech normal    Diagnostic Test Results:  none     ASSESSMENT/PLAN:   Bright was seen today for physical.    Diagnoses and all orders for this visit:    Encounter for routine adult health examination without abnormal findings    Hyperlipidemia LDL goal <130  -     simvastatin (ZOCOR) 20 MG tablet; Take 1 tablet (20 mg) by mouth At Bedtime Please cancel the 30-day supply Rx.  -     Lipid panel reflex to direct LDL Fasting  -     OFFICE/OUTPT VISIT,EST,LEVL III  Reinforced heart healthy lifestyle.    Bronchospasm  -     albuterol (PROAIR HFA/PROVENTIL HFA/VENTOLIN HFA) 108 (90 Base) MCG/ACT inhaler; Inhale 2 puffs into the lungs every 4 hours as needed for shortness of breath / dyspnea or wheezing Hold until patient calls for refill.  -     OFFICE/OUTPT VISIT,EST,LEVL III  Rare occurrence.  Refill sent as last Rx was > 1 year ago.    Bilateral impacted cerumen  -     HC REMOVAL IMPACTED CERUMEN IRRIGATION/LVG UNILAT  -     HC REMOVAL IMPACTED CERUMEN IRRIGATION/LVG UNILAT  Discussed with patient findings on exam.  Advised on treatment options; she concurred with aural irrigation today.  Patient was advised of procedure, expected outcome and possible risks.  CMA performed aural irrigation to both ears using warm tap water with partial evacuation of cerumen from both ears.  Remaining cerumen removed under otoscopy by this provider successfully, revealing Tympanic membrane  intact bothg ears.  Advised routine ear care.  Return  "precautions discussed and given to patient.    Screening for prostate cancer  -     Prostate spec antigen screen  Advised limitations of test, and current guidelines in screening. Patient would like to pursue PSA.    In addition to preventive health visit, spent another 10 minutes in counseling and coordination of care as above.    COUNSELING:   Reviewed preventive health counseling, as reflected in patient instructions    Estimated body mass index is 27.73 kg/m  as calculated from the following:    Height as of this encounter: 1.88 m (6' 2\").    Weight as of this encounter: 98 kg (216 lb).     Weight management plan: Discussed healthy diet and exercise guidelines     reports that he has never smoked. He has never used smokeless tobacco.      Counseling Resources:  ATP IV Guidelines  Pooled Cohorts Equation Calculator  FRAX Risk Assessment  ICSI Preventive Guidelines  Dietary Guidelines for Americans, 2010  USDA's MyPlate  ASA Prophylaxis  Lung CA Screening    Moises Reynolds MD  [facility]    "

## 2020-11-14 ENCOUNTER — HEALTH MAINTENANCE LETTER (OUTPATIENT)
Age: 55
End: 2020-11-14

## 2020-11-30 ENCOUNTER — ALLIED HEALTH/NURSE VISIT (OUTPATIENT)
Dept: FAMILY MEDICINE | Facility: CLINIC | Age: 55
End: 2020-11-30
Payer: COMMERCIAL

## 2020-11-30 DIAGNOSIS — Z23 NEED FOR PROPHYLACTIC VACCINATION AND INOCULATION AGAINST INFLUENZA: Primary | ICD-10-CM

## 2020-11-30 PROCEDURE — 90682 RIV4 VACC RECOMBINANT DNA IM: CPT

## 2020-11-30 PROCEDURE — 99207 PR NO CHARGE NURSE ONLY: CPT

## 2020-11-30 PROCEDURE — 90471 IMMUNIZATION ADMIN: CPT

## 2020-12-30 ENCOUNTER — E-VISIT (OUTPATIENT)
Dept: URGENT CARE | Facility: URGENT CARE | Age: 55
End: 2020-12-30
Payer: COMMERCIAL

## 2020-12-30 DIAGNOSIS — Z20.822 CLOSE EXPOSURE TO 2019 NOVEL CORONAVIRUS: Primary | ICD-10-CM

## 2020-12-30 PROCEDURE — 99421 OL DIG E/M SVC 5-10 MIN: CPT | Performed by: PHYSICIAN ASSISTANT

## 2020-12-31 NOTE — PATIENT INSTRUCTIONS
"  Dear Bright Andrew,    Based on your exposure to COVID-19 (coronavirus), we would like to test you for this virus. I have placed an order for this test. The best time for testing is 5-7 days after the exposure.    How to schedule:  For all employees or close contacts (except [place] and [employer] - see below), go to your [employer] home page and scroll down to the section that says  You have an appointment that needs to be scheduled  and click the large green button that says  Schedule Now  and follow the steps to find the next available opening.     If you are unable to complete these steps or if you cannot find any available times, please call [phone] to schedule employee testing.     [place] employees or close contacts, please call [phone].   [place] ([employer]) employees or close contacts call [phone].    Return to work/school/ guidance:   For people with high risk exposures outside the home    Please let your workplace manager and staffing office know when your quarantine ends.     We can not give you an exact date as it depends on the information below. You can calculate this on your own or work with your manager/staffing office to calculate this. (For example if you were exposed on 10/4, you would have to quarantine for 14 full days. That would be through 10/18. You could return on 10/19.)    Quarantine Guidelines:  Patients (\"contacts\") who have been in close prolonged contact of an infected person(s) (within six feet for at least 15 minutes within a 24 hour period), and remain asymptomatic should enter quarantine based on the following options:    14-day quarantine period (this remains the CDC recommendation for the greatest protection against spread of COVID-19) OR    Minimum 7-day quarantine with negative RT-PCR test collected on day 5 or later OR    10-day quarantine with no test  Quarantine Guideline exceptions are as follows:  ? People whose high-risk exposure was a household " member should always quarantine for 14 days from their last date of exposure  ? Residents of congregate care and congregate living settings should always quarantine for 14 days from their last date of exposure  ? Individuals who work in health care, congregate care, or congregate living should be off work for 14 days from their last date of exposure. Community activities for this group can be resumed based on options above.  ? For people with high risk exposure to an individual they live with it is still recommended to quarantine for 14 days the last date of exposure even if the covid test is negative.     Note: If you have ongoing exposure to the covid positive person, this quarantine period may be more than 14 days. (For example, if you are continued to be exposed to your child who tested positive and cannot isolate from them, then the quarantine of 7-14 days can't start until your child is no longer contagious. This is typically 10 days from onset of the child's symptoms. So the total duration may be 17-24 days in this case.)    You should continue symptom monitoring until day 14 post-exposure. If you develop signs or symptoms of COVID-19, isolate and get tested (even if you have been tested already).    How to quarantine:   Stay home and away from others. Don't go to school or anywhere else. Generally quarantine means staying home from work but there are some exceptions to this. Please contact your workplace.  No hugging, kissing or shaking hands.  Don't let anyone visit.  Cover your mouth and nose with a mask, tissue or washcloth to avoid spreading germs.  Wash your hands and face often. Use soap and water.    What are the symptoms of COVID-19?  The most common symptoms are cough, fever and trouble breathing. Less common symptoms include headache, body aches, fatigue (feeling very tired), chills, sore throat, stuffy or runny nose, diarrhea (loose poop), loss of taste or smell, belly pain, and nausea or vomiting  (feeling sick to your stomach or throwing up).  After 14 days, if you have still don't have symptoms, you likely don't have this virus.  If you develop symptoms, follow these guidelines.  If you're normally healthy: Please start another eVisit.  If you have a serious health problem (like cancer, heart failure, an organ transplant or kidney disease): Call your specialty clinic. Let them know that you might have COVID-19.    Where can I get more information?  [facility] - About COVID-19: [email or web address]  CDC - What to Do If You're Sick: www.cdc.gov/coronavirus/2019-ncov/about/steps-when-sick.html  CDC - Ending Home Isolation: www.cdc.gov/coronavirus/2019-ncov/hcp/disposition-in-home-patients.html  CDC - Caring for Someone: www.cdc.gov/coronavirus/2019-ncov/if-you-are-sick/care-for-someone.html  [facility] clinical trials (COVID-19 research studies): [email or web address].[email or web address]-clinical-trials  Below are the COVID-19 hotlines at the [facility] ([facility]). Interpreters are available.  For health questions: Call [phone] or [phone] (7 a.m. to 7 p.m.)  For questions about schools and childcare: Call [phone] or 1-623.996.4910 (7 a.m. to 7 p.m.)

## 2021-01-12 DIAGNOSIS — Z20.822 CLOSE EXPOSURE TO 2019 NOVEL CORONAVIRUS: ICD-10-CM

## 2021-01-12 PROCEDURE — U0005 INFEC AGEN DETEC AMPLI PROBE: HCPCS | Performed by: PHYSICIAN ASSISTANT

## 2021-01-12 PROCEDURE — U0003 INFECTIOUS AGENT DETECTION BY NUCLEIC ACID (DNA OR RNA); SEVERE ACUTE RESPIRATORY SYNDROME CORONAVIRUS 2 (SARS-COV-2) (CORONAVIRUS DISEASE [COVID-19]), AMPLIFIED PROBE TECHNIQUE, MAKING USE OF HIGH THROUGHPUT TECHNOLOGIES AS DESCRIBED BY CMS-2020-01-R: HCPCS | Performed by: PHYSICIAN ASSISTANT

## 2021-01-13 LAB
SARS-COV-2 RNA RESP QL NAA+PROBE: NOT DETECTED
SPECIMEN SOURCE: NORMAL

## 2021-02-11 NOTE — PROGRESS NOTES
Screening Questionnaire for Adult Immunization    Are you sick today?   No   Do you have allergies to medications, food, a vaccine component or latex?   No   Have you ever had a serious reaction after receiving a vaccination?   No   Do you have a long-term health problem with heart disease, lung disease, asthma, kidney disease, metabolic disease (e.g. diabetes), anemia, or other blood disorder?   No   Do you have cancer, leukemia, HIV/AIDS, or any other immune system problem?   No   In the past 3 months, have you taken medications that affect  your immune system, such as prednisone, other steroids, or anticancer drugs; drugs for the treatment of rheumatoid arthritis, Crohn s disease, or psoriasis; or have you had radiation treatments?   No   Have you had a seizure, or a brain or other nervous system problem?   No   During the past year, have you received a transfusion of blood or blood     products, or been given immune (gamma) globulin or antiviral drug?   No   For women: Are you pregnant or is there a chance you could become        pregnant during the next month?   No   Have you received any vaccinations in the past 4 weeks?   No     Immunization questionnaire answers were all negative.        Per orders of Dr. Reynolds, injection of Shingrix given by Marcela Leggett. Patient instructed to remain in clinic for 15 minutes afterwards, and to report any adverse reaction to me immediately.       Screening performed by Marcela Leggett on 7/19/2019 at 8:42 AM.   [facility]

## 2021-03-25 ENCOUNTER — E-VISIT (OUTPATIENT)
Dept: URGENT CARE | Facility: CLINIC | Age: 56
End: 2021-03-25
Payer: COMMERCIAL

## 2021-03-25 DIAGNOSIS — N39.0 ACUTE UTI (URINARY TRACT INFECTION): Primary | ICD-10-CM

## 2021-03-25 PROCEDURE — 99421 OL DIG E/M SVC 5-10 MIN: CPT | Performed by: NURSE PRACTITIONER

## 2021-03-25 RX ORDER — NITROFURANTOIN 25; 75 MG/1; MG/1
100 CAPSULE ORAL 2 TIMES DAILY
Qty: 10 CAPSULE | Refills: 0 | Status: SHIPPED | OUTPATIENT
Start: 2021-03-25 | End: 2021-03-30

## 2021-03-25 NOTE — PATIENT INSTRUCTIONS
Dear Bright Andrew    After reviewing your responses, I've been able to diagnose you with a urinary tract infection, which is a common infection of the bladder with bacteria.  This is not a sexually transmitted infection, though urinating immediately after intercourse can help prevent infections.  Drinking lots of fluids is also helpful to clear your current infection and prevent the next one.      I have sent a prescription for antibiotics to your pharmacy to treat this infection.    It is important that you take all of your prescribed medication even if your symptoms are improving after a few doses.  Taking all of your medicine helps prevent the symptoms from returning.     If your symptoms worsen, you develop pain in your back or stomach, develop fevers, or are not improving in 5 days, please contact your primary care provider for an appointment or visit any of our convenient Walk-in or Urgent Care Centers to be seen, which can be found on our website here.    Thanks again for choosing us as your health care partner,    Jeannette Feng, CNP

## 2021-03-31 ENCOUNTER — IMMUNIZATION (OUTPATIENT)
Dept: LAB | Facility: CLINIC | Age: 56
End: 2021-03-31
Payer: COMMERCIAL

## 2021-07-11 ENCOUNTER — MYC MEDICAL ADVICE (OUTPATIENT)
Dept: FAMILY MEDICINE | Facility: CLINIC | Age: 56
End: 2021-07-11

## 2021-07-11 DIAGNOSIS — Z12.5 SCREENING FOR PROSTATE CANCER: Primary | ICD-10-CM

## 2021-07-11 DIAGNOSIS — Z13.1 SCREENING FOR DIABETES MELLITUS: ICD-10-CM

## 2021-07-11 DIAGNOSIS — E78.2 MIXED HYPERLIPIDEMIA: ICD-10-CM

## 2021-07-12 NOTE — TELEPHONE ENCOUNTER
Dr. Reynolds,    Patient has upcoming appt for physical with you and would like labs done prior.  Pended for your approval. Tamra TREJO RN

## 2021-08-07 ASSESSMENT — ENCOUNTER SYMPTOMS
CHILLS: 0
DIZZINESS: 0
HEADACHES: 0
WEAKNESS: 0
FEVER: 0
NERVOUS/ANXIOUS: 0
CONSTIPATION: 0
ABDOMINAL PAIN: 0
FREQUENCY: 0
ARTHRALGIAS: 0
EYE PAIN: 0
HEARTBURN: 0
SORE THROAT: 0
PARESTHESIAS: 0
MYALGIAS: 0
COUGH: 0
JOINT SWELLING: 0
NAUSEA: 0
HEMATURIA: 0
HEMATOCHEZIA: 0
DYSURIA: 0
DIARRHEA: 0
PALPITATIONS: 0
SHORTNESS OF BREATH: 0

## 2021-08-12 ENCOUNTER — LAB (OUTPATIENT)
Dept: LAB | Facility: CLINIC | Age: 56
End: 2021-08-12

## 2021-08-12 ENCOUNTER — OFFICE VISIT (OUTPATIENT)
Dept: FAMILY MEDICINE | Facility: CLINIC | Age: 56
End: 2021-08-12
Payer: COMMERCIAL

## 2021-08-12 VITALS
SYSTOLIC BLOOD PRESSURE: 110 MMHG | HEART RATE: 69 BPM | OXYGEN SATURATION: 96 % | BODY MASS INDEX: 27.55 KG/M2 | HEIGHT: 75 IN | DIASTOLIC BLOOD PRESSURE: 84 MMHG | WEIGHT: 221.6 LBS | RESPIRATION RATE: 14 BRPM | TEMPERATURE: 96.7 F

## 2021-08-12 DIAGNOSIS — E78.2 MIXED HYPERLIPIDEMIA: ICD-10-CM

## 2021-08-12 DIAGNOSIS — Z13.1 SCREENING FOR DIABETES MELLITUS: ICD-10-CM

## 2021-08-12 DIAGNOSIS — Z12.5 SCREENING FOR PROSTATE CANCER: ICD-10-CM

## 2021-08-12 DIAGNOSIS — Z00.00 ROUTINE GENERAL MEDICAL EXAMINATION AT A HEALTH CARE FACILITY: Primary | ICD-10-CM

## 2021-08-12 LAB
CHOLEST SERPL-MCNC: 210 MG/DL
FASTING STATUS PATIENT QL REPORTED: YES
FASTING STATUS PATIENT QL REPORTED: YES
GLUCOSE BLD-MCNC: 88 MG/DL (ref 70–99)
HDLC SERPL-MCNC: 52 MG/DL
LDLC SERPL CALC-MCNC: 132 MG/DL
NONHDLC SERPL-MCNC: 158 MG/DL
PSA SERPL-MCNC: 1.05 UG/L (ref 0–4)
TRIGL SERPL-MCNC: 128 MG/DL

## 2021-08-12 PROCEDURE — 82947 ASSAY GLUCOSE BLOOD QUANT: CPT

## 2021-08-12 PROCEDURE — 36415 COLL VENOUS BLD VENIPUNCTURE: CPT

## 2021-08-12 PROCEDURE — 80061 LIPID PANEL: CPT

## 2021-08-12 PROCEDURE — G0103 PSA SCREENING: HCPCS

## 2021-08-12 PROCEDURE — 99396 PREV VISIT EST AGE 40-64: CPT | Performed by: FAMILY MEDICINE

## 2021-08-12 PROCEDURE — 99213 OFFICE O/P EST LOW 20 MIN: CPT | Mod: 25 | Performed by: FAMILY MEDICINE

## 2021-08-12 RX ORDER — SIMVASTATIN 20 MG
20 TABLET ORAL AT BEDTIME
Qty: 90 TABLET | Refills: 3 | Status: SHIPPED | OUTPATIENT
Start: 2021-08-12 | End: 2022-08-11

## 2021-08-12 ASSESSMENT — ENCOUNTER SYMPTOMS
CHILLS: 0
ARTHRALGIAS: 0
FEVER: 0
CONSTIPATION: 0
FREQUENCY: 0
DIARRHEA: 0
DYSURIA: 0
NAUSEA: 0
COUGH: 0
PALPITATIONS: 0
JOINT SWELLING: 0
ABDOMINAL PAIN: 0
SORE THROAT: 0
PARESTHESIAS: 0
HEMATURIA: 0
NERVOUS/ANXIOUS: 0
HEARTBURN: 0
DIZZINESS: 0
EYE PAIN: 0
HEADACHES: 0
SHORTNESS OF BREATH: 0
HEMATOCHEZIA: 0
WEAKNESS: 0
MYALGIAS: 0

## 2021-08-12 ASSESSMENT — MIFFLIN-ST. JEOR: SCORE: 1916.83

## 2021-08-12 NOTE — PROGRESS NOTES
SUBJECTIVE:   CC: Bright Andrew is an 56 year old male who presents for preventative health visit.       Patient has been advised of split billing requirements and indicates understanding: Yes  Healthy Habits:     Getting at least 3 servings of Calcium per day:  Yes    Bi-annual eye exam:  Yes    Dental care twice a year:  Yes    Sleep apnea or symptoms of sleep apnea:  None    Diet:  Low fat/cholesterol    Frequency of exercise:  2-3 days/week    Duration of exercise:  30-45 minutes    Taking medications regularly:  Yes    Medication side effects:  None    PHQ-2 Total Score: 0    Additional concerns today:  No      Hyperlipidemia Follow-Up      Are you regularly taking any medication or supplement to lower your cholesterol?   Yes- statin    Are you having muscle aches or other side effects that you think could be caused by your cholesterol lowering medication?  No      Today's PHQ-2 Score:   PHQ-2 ( 1999 Pfizer) 8/7/2021   Q1: Little interest or pleasure in doing things 0   Q2: Feeling down, depressed or hopeless 0   PHQ-2 Score 0   Q1: Little interest or pleasure in doing things Not at all   Q2: Feeling down, depressed or hopeless Not at all   PHQ-2 Score 0       Abuse: Current or Past(Physical, Sexual or Emotional)- No  Do you feel safe in your environment? Yes        Social History     Tobacco Use     Smoking status: Never Smoker     Smokeless tobacco: Never Used   Substance Use Topics     Alcohol use: Yes     Comment: few times a week     If you drink alcohol do you typically have >3 drinks per day or >7 drinks per week? No    Alcohol Use 8/12/2021   Prescreen: >3 drinks/day or >7 drinks/week? -   Prescreen: >3 drinks/day or >7 drinks/week? No       Last PSA:   PSA   Date Value Ref [place] Status   07/29/2020 0.98 0 - 4 ug/L Final     Comment:     Assay Method:  Chemiluminescence using Siemens Vista analyzer       Reviewed orders with patient. Reviewed health maintenance and updated orders accordingly -  Yes  Patient Active Problem List   Diagnosis     Hyperlipidemia LDL goal <130     Screening for hypertension     FH: cardiovascular disease     Lumbago     Overweight (BMI 25.0-29.9)     Past Surgical History:   Procedure Laterality Date     COLONOSCOPY N/A 11/20/2015    Procedure: COLONOSCOPY;  Surgeon: Melquiades Street MD;  Location: [facility]       Social History     Tobacco Use     Smoking status: Never Smoker     Smokeless tobacco: Never Used   Substance Use Topics     Alcohol use: Yes     Comment: few times a week     Family History   Problem Relation Age of Onset     C.A.D. Maternal Grandfather      Cardiovascular Maternal Grandfather      Heart Disease Father         congenital heart dz and blockage     Cancer Father         lung     Skin Cancer Father      Skin Cancer Mother          Current Outpatient Medications   Medication Sig Dispense Refill     aspirin 81 MG tablet Take 81 mg by mouth daily Patient takes every other day       fish oil-omega-3 fatty acids (FISH OIL) 1000 MG capsule Take 1 capsule (1 g) by mouth daily 90 capsule prn     loratadine (CLARITIN) 10 MG tablet Take 10 mg by mouth daily as needed.       simvastatin (ZOCOR) 20 MG tablet Take 1 tablet (20 mg) by mouth At Bedtime Please cancel the 30-day supply Rx. 90 tablet 3     albuterol (PROAIR HFA/PROVENTIL HFA/VENTOLIN HFA) 108 (90 Base) MCG/ACT inhaler Inhale 2 puffs into the lungs every 4 hours as needed for shortness of breath / dyspnea or wheezing Hold until patient calls for refill. (Patient not taking: Reported on 8/12/2021) 18 g 1     No Known Allergies    Reviewed and updated as needed this visit by clinical staff  Tobacco  Allergies  Meds   Med Hx  Surg Hx  Fam Hx  Soc Hx        Reviewed and updated as needed this visit by Provider                Past Medical History:   Diagnosis Date     Cellulitis and abscess of unspecified site     recurrent      Chest pain, unspecified       Past Surgical History:   Procedure  "Laterality Date     COLONOSCOPY N/A 11/20/2015    Procedure: COLONOSCOPY;  Surgeon: Melquiades Street MD;  Location: [facility]       Review of Systems   Constitutional: Negative for chills and fever.   HENT: Negative for congestion, ear pain, hearing loss and sore throat.    Eyes: Negative for pain and visual disturbance.   Respiratory: Negative for cough and shortness of breath.    Cardiovascular: Negative for chest pain, palpitations and peripheral edema.   Gastrointestinal: Negative for abdominal pain, constipation, diarrhea, heartburn, hematochezia and nausea.   Genitourinary: Negative for discharge, dysuria, frequency, genital sores, hematuria, impotence and urgency.   Musculoskeletal: Negative for arthralgias, joint swelling and myalgias.   Skin: Negative for rash.   Neurological: Negative for dizziness, weakness, headaches and paresthesias.   Psychiatric/Behavioral: Negative for mood changes. The patient is not nervous/anxious.      OBJECTIVE:   /84   Pulse 69   Temp (!) 96.7  F (35.9  C) (Tympanic)   Resp 14   Ht 1.899 m (6' 2.75\")   Wt 100.5 kg (221 lb 9.6 oz)   SpO2 96%   BMI 27.88 kg/m      Physical Exam  GENERAL APPEARANCE: overweight,  alert and no distress  EYES: pink conj, no icterus, PERRL, EOMI  HENT: ear canals and TM's normal, nose and mouth without ulcers or lesions, oropharynx clear and oral mucous membranes moist  NECK: no adenopathy, no asymmetry, masses, or scars and thyroid normal to palpation  RESP: lungs clear to auscultation - no rales, rhonchi or wheezes  CV: regular rates and rhythm, normal S1 S2, no S3 or S4, no murmur, click or rub, no peripheral edema and peripheral pulses strong  ABDOMEN: soft, nontender, no hepatosplenomegaly, no masses and bowel sounds normal  RECTAL: deferred  MS: no musculoskeletal defects are noted and gait is age appropriate without ataxia  SKIN: no suspicious lesions or rashes  NEURO: Normal strength and tone, sensory exam grossly normal, " "mentation intact and speech normal    Diagnostic Test Results:  none     ASSESSMENT/PLAN:   Bright was seen today for physical.    Diagnoses and all orders for this visit:    Routine general medical examination at a health care facility    Mixed hyperlipidemia  -     simvastatin (ZOCOR) 20 MG tablet; Take 1 tablet (20 mg) by mouth At Bedtime Please cancel the 30-day supply Rx.  -     OFFICE/OUTPT VISIT,EST,LEVL III  Results of labs pendingg.  Reinforced heart healthy lifestyle.    Other orders  -     REVIEW OF HEALTH MAINTENANCE PROTOCOL ORDERS        Patient has been advised of split billing requirements and indicates understanding: Yes  COUNSELING:   Reviewed preventive health counseling, as reflected in patient instructions    Estimated body mass index is 27.88 kg/m  as calculated from the following:    Height as of this encounter: 1.899 m (6' 2.75\").    Weight as of this encounter: 100.5 kg (221 lb 9.6 oz).     Weight management plan: Discussed healthy diet and exercise guidelines    He reports that he has never smoked. He has never used smokeless tobacco.      Counseling Resources:  ATP IV Guidelines  Pooled Cohorts Equation Calculator  FRAX Risk Assessment  ICSI Preventive Guidelines  Dietary Guidelines for Americans, 2010  USDA's MyPlate  ASA Prophylaxis  Lung CA Screening    Moises Reynolds MD  [facility]  "

## 2021-08-12 NOTE — PATIENT INSTRUCTIONS
Be consistent with low trans fat and saturated fat diet.  Eat food rich in omega-3-fatty acids as you tolerate. (salmon, olive oil)  Eat 5 cups of vegetables, fruits and whole grains per day.  Limit starchy food (white rice, white bread, white pasta, white potatoes) to less than a cup per meal.  Minimize sweets, junk food and fastfood. Limit soda beverages to one serving per day; best to avoid it altogether though.  Exercise: moderate intensity sustained for at least 30 mins per episode, goal of 150 mins per week at least  Combine cardiovascular and resistance exercises.  These exercise recommendations are in addition to your daily activity at work or home.  Work on losing weight if you are above your goal body mass index.    Preventative Care Visits include: Yearly physicals, Well-child visits, Welcome to Medicare visits, & Medicare yearly wellness exams.    The purpose of these visits is to discuss your medical history and prevent health problems before you are sick.  You may need to pay a copay, coinsurance or deductible if your visit today includes testing or treating for a new or existing condition.    Additional charges may be incurred for today's visit. If you have questions about what your insurance plan covers, please contact your Insurance Company's member service department.  If you have questions specific to a bill you have already received from [facility], please contact the [facility] Billing Office at [phone].     Preventive Health Recommendations  Male Ages 50 - 64    Yearly exam:             See your health care provider every year in order to  o   Review health changes.   o   Discuss preventive care.    o   Review your medicines if your doctor has prescribed any.     Have a cholesterol test every 5 years, or more frequently if you are at risk for high cholesterol/heart disease.     Have a diabetes test (fasting glucose) every three years. If you are at risk for diabetes, you should have this test  more often.     Have a colonoscopy at age 50, or have a yearly FIT test (stool test). These exams will check for colon cancer.      Talk with your health care provider about whether or not a prostate cancer screening test (PSA) is right for you.    You should be tested each year for STDs (sexually transmitted diseases), if you re at risk.     Shots: Get a flu shot each year. Get a tetanus shot every 10 years.     Nutrition:    Eat at least 5 servings of fruits and vegetables daily.     Eat whole-grain bread, whole-wheat pasta and brown rice instead of white grains and rice.     Get adequate Calcium and Vitamin D.     Lifestyle    Exercise for at least 150 minutes a week (30 minutes a day, 5 days a week). This will help you control your weight and prevent disease.     Limit alcohol to one drink per day.     No smoking.     Wear sunscreen to prevent skin cancer.     See your dentist every six months for an exam and cleaning.     See your eye doctor every 1 to 2 years.

## 2021-09-12 ENCOUNTER — HEALTH MAINTENANCE LETTER (OUTPATIENT)
Age: 56
End: 2021-09-12

## 2021-12-15 ENCOUNTER — MYC MEDICAL ADVICE (OUTPATIENT)
Dept: FAMILY MEDICINE | Facility: CLINIC | Age: 56
End: 2021-12-15
Payer: COMMERCIAL

## 2022-04-18 ENCOUNTER — OFFICE VISIT (OUTPATIENT)
Dept: DERMATOLOGY | Facility: CLINIC | Age: 57
End: 2022-04-18
Payer: COMMERCIAL

## 2022-04-18 VITALS — OXYGEN SATURATION: 99 % | SYSTOLIC BLOOD PRESSURE: 142 MMHG | HEART RATE: 81 BPM | DIASTOLIC BLOOD PRESSURE: 83 MMHG

## 2022-04-18 DIAGNOSIS — L82.1 SEBORRHEIC KERATOSIS: ICD-10-CM

## 2022-04-18 DIAGNOSIS — L81.4 LENTIGO: ICD-10-CM

## 2022-04-18 DIAGNOSIS — D18.01 ANGIOMA OF SKIN: ICD-10-CM

## 2022-04-18 DIAGNOSIS — D48.5 NEOPLASM OF UNCERTAIN BEHAVIOR OF SKIN: ICD-10-CM

## 2022-04-18 DIAGNOSIS — D22.9 NEVUS: Primary | ICD-10-CM

## 2022-04-18 PROCEDURE — 99213 OFFICE O/P EST LOW 20 MIN: CPT | Mod: 25 | Performed by: PHYSICIAN ASSISTANT

## 2022-04-18 PROCEDURE — 88305 TISSUE EXAM BY PATHOLOGIST: CPT | Performed by: PATHOLOGY

## 2022-04-18 PROCEDURE — 11102 TANGNTL BX SKIN SINGLE LES: CPT | Performed by: PHYSICIAN ASSISTANT

## 2022-04-18 NOTE — PROGRESS NOTES
HPI:   CC: Bright Andrew is a 57 year old male who presents for Full skin cancer screening to rule out skin cancer.   Last Skin Exam: 2 yrs ago      1st Baseline: no  Personal HX of Skin Cancer: no   Personal HX of Malignant Melanoma: no   Family HX of Skin Cancer / Malignant Melanoma: Yes mother and father both with NMSC  Personal HX of Atypical Moles:   no  Risk factors: sun exposure  New / Changing lesions: no  Social History: Retired 2 years ago; has been traveling a lot. 4 grandchildren.    On review of systems, there are no further skin complaints, patient is feeling otherwise well.  See patient intake sheet.  ROS of the following were done and are negative: Constitutional, Eyes, Ears, Nose,   Mouth, Throat, Cardiovascular, Respiratory, GI, Genitourinary, Musculoskeletal,   Psychiatric, Endocrine, Allergic/Immunologic.      PHYSICAL EXAM:   BP (!) 142/83 (BP Location: Left arm, Cuff Size: Adult Regular)   Pulse 81   SpO2 99%   Skin exam performed as follows: Type 2 skin. Mood appropriate  Alert and Oriented X 3. Well developed, well nourished in no distress.  General appearance: Normal  Head including face: Normal  Eyes: conjunctiva and lids: Normal  Mouth: Lips, mucosa: Normal  Neck: Normal  Chest-breast/axillae: Normal  Back: Normal  Abdomen: Normal  Cardiovascular: Exam of peripheral vascular system by observation for swelling, varicosities, edema: Normal  Genitalia: groin, buttocks: Normal  Extremities: digits/nails (clubbing): Normal  Eccrine and Apocrine glands: Normal  Right upper extremity: Normal  Left upper extremity: Normal  Right lower extremity: Normal  Left lower extremity: Normal  Skin: Scalp and body hair: See below    Pt deferred exam of breasts, groin, buttocks: No    Other physical findings:  1. Multiple pigmented macules on extremities and trunk  2. Multiple pigmented macules on face, trunk and extremities  3. Multiple vascular papules on trunk, arms and legs  4. Multiple scattered  keratotic plaques  5. 5 mm pearly papule on the left superior helix     Except as noted above, no other signs of skin cancer or melanoma.     ASSESSMENT/PLAN:   Benign Full skin cancer screening today. . Patient with history of none  Advised on monthly self exams and 1 year  Patient Education: Appropriate brochures given.    Multiple benign appearing nevi on arms, legs and trunk. Discussed ABCDEs of melanoma and sunscreen.   Multiple lentigos on arms, legs and trunk. Advised benign, no treatment needed.  Multiple scattered angiomas. Advised benign, no treatment needed.   Seborrheic keratosis on arms, legs and trunk. Advised benign, no treatment needed.  R/o BCC on the left superior helix. Shave biopsy performed.  Area cleaned and anesthetized with 1% lidocaine with epinephrine.  Dermablade used to remove the lesion and sent to pathology. Bleeding was cauterized. Pt tolerated procedure well with no complications.   Tinea pedis - he has OTC that works for him.           Follow-up: yearly FSE/PRN sooner  CC:   1.) Patient was asked about new and changing moles. YES  2.) Patient received a complete physical skin examination: YES  3.) Patient was counseled to perform a monthly self skin examination: YES  Scribed By: ]Neena Wu, MS, PA-C

## 2022-04-18 NOTE — PATIENT INSTRUCTIONS
Wound Care Instructions     FOR SUPERFICIAL WOUNDS     [facility]  [phone]                 AFTER 24 HOURS YOU SHOULD REMOVE THE BANDAGE AND BEGIN DAILY DRESSING CHANGES AS FOLLOWS:     1) Remove Dressing.     2) Clean and dry the area with tap water using a Q-tip or sterile gauze pad.     3) Apply Vaseline, Aquaphor, Polysporin ointment or Bacitracin ointment over entire wound.  Do NOT use Neosporin ointment.     4) Cover the wound with a band-aid, or a sterile non-stick gauze pad and micropore paper tape    REPEAT THESE INSTRUCTIONS AT LEAST ONCE A DAY UNTIL THE WOUND HAS COMPLETELY HEALED.    It is an old wives tale that a wound heals better when it is exposed to air and allowed to dry out. The wound will heal faster with a better cosmetic result if it is kept moist with ointment and covered with a bandage.    **Do not let the wound dry out.**    Supplies Needed:      *Cotton tipped applicators (Q-tips)    *Vaseline, Aquaphor, Polysporin or Bacitracin Ointment (NOT NEOSPORIN)    *Band-aids or non-stick gauze pads and micropore paper tape.      PATIENT INFORMATION:    During the healing process you will notice a number of changes. All wounds develop a small halo of redness surrounding the wound.  This means healing is occurring. Severe itching with extensive redness usually indicates sensitivity to the ointment or bandage tape used to dress the wound.  You should call our office if this develops.      Swelling  and/or discoloration around your surgical site is common, particularly when performed around the eye.    All wounds normally drain.  The larger the wound the more drainage there will be.  After 7-10 days, you will notice the wound beginning to shrink and new skin will begin to grow.  The wound is healed when you can see skin has formed over the entire area.  A healed wound has a healthy, shiny look to the surface and is red to dark pink in color to normalize.  Wounds may  take approximately 4-6 weeks to heal.  Larger wounds may take 6-8 weeks.  After the wound is healed you may discontinue dressing changes.    You may experience a sensation of tightness as your wound heals. This is normal and will gradually subside.    Your healed wound may be sensitive to temperature changes. This sensitivity improves with time, but if you re having a lot of discomfort, try to avoid temperature extremes.    Patients frequently experience itching after their wound appears to have healed because of the continue healing under the skin.  Plain Vaseline will help relieve the itching.      POSSIBLE COMPLICATIONS    BLEEDING:    Leave the bandage in place.  Use tightly rolled up gauze or a cloth to apply direct pressure over the bandage for 30  minutes.  Reapply pressure for an additional 30 minutes if necessary  Use additional gauze and tape to maintain pressure once the bleeding has stopped.

## 2022-04-18 NOTE — LETTER
4/18/2022         RE: Bright Andrew  [address]  [facility] [address]        Dear Colleague,    Thank you for referring your patient, Bright Andrew, to the [facility]. Please see a copy of my visit note below.    HPI:   CC: Bright Andrew is a 57 year old male who presents for Full skin cancer screening to rule out skin cancer.   Last Skin Exam: 2 yrs ago      1st Baseline: no  Personal HX of Skin Cancer: no   Personal HX of Malignant Melanoma: no   Family HX of Skin Cancer / Malignant Melanoma: Yes mother and father both with NMSC  Personal HX of Atypical Moles:   no  Risk factors: sun exposure  New / Changing lesions: no  Social History: Retired 2 years ago; has been traveling a lot. 4 grandchildren.    On review of systems, there are no further skin complaints, patient is feeling otherwise well.  See patient intake sheet.  ROS of the following were done and are negative: Constitutional, Eyes, Ears, Nose,   Mouth, Throat, Cardiovascular, Respiratory, GI, Genitourinary, Musculoskeletal,   Psychiatric, Endocrine, Allergic/Immunologic.      PHYSICAL EXAM:   BP (!) 142/83 (BP Location: Left arm, Cuff Size: Adult Regular)   Pulse 81   SpO2 99%   Skin exam performed as follows: Type 2 skin. Mood appropriate  Alert and Oriented X 3. Well developed, well nourished in no distress.  General appearance: Normal  Head including face: Normal  Eyes: conjunctiva and lids: Normal  Mouth: Lips, mucosa: Normal  Neck: Normal  Chest-breast/axillae: Normal  Back: Normal  Abdomen: Normal  Cardiovascular: Exam of peripheral vascular system by observation for swelling, varicosities, edema: Normal  Genitalia: groin, buttocks: Normal  Extremities: digits/nails (clubbing): Normal  Eccrine and Apocrine glands: Normal  Right upper extremity: Normal  Left upper extremity: Normal  Right lower extremity: Normal  Left lower extremity: Normal  Skin: Scalp and body hair: See below    Pt deferred exam of  breasts, groin, buttocks: No    Other physical findings:  1. Multiple pigmented macules on extremities and trunk  2. Multiple pigmented macules on face, trunk and extremities  3. Multiple vascular papules on trunk, arms and legs  4. Multiple scattered keratotic plaques  5. 5 mm pearly papule on the left superior helix     Except as noted above, no other signs of skin cancer or melanoma.     ASSESSMENT/PLAN:   Benign Full skin cancer screening today. . Patient with history of none  Advised on monthly self exams and 1 year  Patient Education: Appropriate brochures given.    Multiple benign appearing nevi on arms, legs and trunk. Discussed ABCDEs of melanoma and sunscreen.   Multiple lentigos on arms, legs and trunk. Advised benign, no treatment needed.  Multiple scattered angiomas. Advised benign, no treatment needed.   Seborrheic keratosis on arms, legs and trunk. Advised benign, no treatment needed.  R/o BCC on the left superior helix. Shave biopsy performed.  Area cleaned and anesthetized with 1% lidocaine with epinephrine.  Dermablade used to remove the lesion and sent to pathology. Bleeding was cauterized. Pt tolerated procedure well with no complications.   Tinea pedis - he has OTC that works for him.           Follow-up: yearly FSE/PRN sooner  CC:   1.) Patient was asked about new and changing moles. YES  2.) Patient received a complete physical skin examination: YES  3.) Patient was counseled to perform a monthly self skin examination: YES  Scribed By: ]Neena Wu, MS, PASERVANDO          Again, thank you for allowing me to participate in the care of your patient.        Sincerely,        Neena Wu PA-C

## 2022-04-20 LAB
PATH REPORT.COMMENTS IMP SPEC: NORMAL
PATH REPORT.COMMENTS IMP SPEC: NORMAL
PATH REPORT.FINAL DX SPEC: NORMAL
PATH REPORT.GROSS SPEC: NORMAL
PATH REPORT.MICROSCOPIC SPEC OTHER STN: NORMAL
PATH REPORT.RELEVANT HX SPEC: NORMAL

## 2022-05-09 ENCOUNTER — MYC MEDICAL ADVICE (OUTPATIENT)
Dept: FAMILY MEDICINE | Facility: CLINIC | Age: 57
End: 2022-05-09

## 2022-05-09 ENCOUNTER — OFFICE VISIT (OUTPATIENT)
Dept: DERMATOLOGY | Facility: CLINIC | Age: 57
End: 2022-05-09
Payer: COMMERCIAL

## 2022-05-09 VITALS
HEIGHT: 74 IN | OXYGEN SATURATION: 98 % | SYSTOLIC BLOOD PRESSURE: 142 MMHG | DIASTOLIC BLOOD PRESSURE: 90 MMHG | HEART RATE: 76 BPM | BODY MASS INDEX: 28.45 KG/M2

## 2022-05-09 DIAGNOSIS — C44.219 BASAL CELL CARCINOMA (BCC) OF HELIX OF LEFT EAR: Primary | ICD-10-CM

## 2022-05-09 DIAGNOSIS — Z00.00 ROUTINE GENERAL MEDICAL EXAMINATION AT A HEALTH CARE FACILITY: Primary | ICD-10-CM

## 2022-05-09 DIAGNOSIS — Z12.5 SCREENING FOR PROSTATE CANCER: ICD-10-CM

## 2022-05-09 PROCEDURE — 99207 PR NO CHARGE LOS: CPT | Performed by: DERMATOLOGY

## 2022-05-09 PROCEDURE — 17311 MOHS 1 STAGE H/N/HF/G: CPT | Performed by: DERMATOLOGY

## 2022-05-09 NOTE — PROGRESS NOTES
Bright Andrew is an extremely pleasant 57 year old year old male patient here today for evaluation and managment of basal cell carcinoma on left helix.  Patient has no other skin complaints today.  Remainder of the HPI, Meds, PMH, Allergies, FH, and SH was reviewed in chart.      Past Medical History:   Diagnosis Date     Basal cell carcinoma      Cellulitis and abscess of unspecified site     recurrent      Chest pain, unspecified        Past Surgical History:   Procedure Laterality Date     COLONOSCOPY N/A 11/20/2015    Procedure: COLONOSCOPY;  Surgeon: Melquiades Street MD;  Location: [facility]        Family History   Problem Relation Age of Onset     C.A.D. Maternal Grandfather      Cardiovascular Maternal Grandfather      Heart Disease Father         congenital heart dz and blockage     Cancer Father         lung     Skin Cancer Father      Skin Cancer Mother        Social History     Socioeconomic History     Marital status:      Spouse name: Not on file     Number of children: Not on file     Years of education: Not on file     Highest education level: Not on file   Occupational History     Not on file   Tobacco Use     Smoking status: Never Smoker     Smokeless tobacco: Never Used   Vaping Use     Vaping Use: Never used   Substance and Sexual Activity     Alcohol use: Yes     Comment: few times a week     Drug use: No     Sexual activity: Yes     Partners: Female     Birth control/protection: None   Other Topics Concern     Parent/sibling w/ CABG, MI or angioplasty before 65F 55M? No   Social History Narrative     Not on file     Social Determinants of Health     Financial Resource Strain: Not on file   Food Insecurity: Not on file   Transportation Needs: Not on file   Physical Activity: Not on file   Stress: Not on file   Social Connections: Not on file   Intimate Partner Violence: Not on file   Housing Stability: Not on file       Outpatient Encounter Medications as of 5/9/2022   Medication Sig  "Dispense Refill     albuterol (PROAIR HFA/PROVENTIL HFA/VENTOLIN HFA) 108 (90 Base) MCG/ACT inhaler Inhale 2 puffs into the lungs every 4 hours as needed for shortness of breath / dyspnea or wheezing Hold until patient calls for refill. (Patient not taking: No sig reported) 18 g 1     aspirin 81 MG tablet Take 81 mg by mouth daily Patient takes every other day       ciclopirox (LOPROX) 0.77 % cream Apply topically 2 times daily 30 g 3     fish oil-omega-3 fatty acids (FISH OIL) 1000 MG capsule Take 1 capsule (1 g) by mouth daily 90 capsule prn     loratadine (CLARITIN) 10 MG tablet Take 10 mg by mouth daily as needed.       simvastatin (ZOCOR) 20 MG tablet Take 1 tablet (20 mg) by mouth At Bedtime Please cancel the 30-day supply Rx. 90 tablet 3     No facility-administered encounter medications on file as of 5/9/2022.             O:   NAD, WDWN, Alert & Oriented, Mood & Affect wnl, Vitals stable   Here today alone   BP (!) 142/90   Pulse 76   Ht 1.88 m (6' 2\")   SpO2 98%   BMI 28.45 kg/m     General appearance normal   Vitals stable   Alert, oriented and in no acute distress     L helix 5mm scaly papule       Eyes: Conjunctivae/lids:Normal     ENT: Lips, buccal mucosa, tongue: normal    MSK:Normal    Cardiovascular: peripheral edema none    Pulm: Breathing Normal    Neuro/Psych: Orientation:Alert and Orientedx3 ; Mood/Affect:normal       A/P:  1. L helix basal cell carcinoma   MOHS:   Location    The rationale for Mohs surgery was discussed with the patient and consent was obtained.  The risks and benefits as well as alternatives to therapy were discussed, in detail.  Specifically, the risks of infection, scarring, bleeding, prolonged wound healing, incomplete removal, allergy to anesthesia, nerve injury and recurrence were addressed.  Indication for Mohs was Location. Prior to the procedure, the treatment site was clearly identified and, if available, confirmed with previous photos and confirmed by the patient  "  All components of the Universal Protocol/PAUSE rule were completed.  The Mohs surgeon operated in two distinct and integrated capacities as the surgeon and pathologist.      The area was prepped with Betasept.  A rim of normal appearing skin was marked circumferentially around the lesion.  The area was infiltrated with local anesthesia.  The tumor was first debulked to remove all clinically apparent tumor.  An incision following the standard Mohs approach was done and the specimen was oriented,mapped and placed in 1 block(s).  Each specimen was then chromacoded and processed in the Mohs laboratory using standard Mohs technique and submitted for frozen section histology.  Frozen section analysis showed  residual tumor but CLEAR MARGINS.    First stage:Orthokeratosis of epidermis with a proliferation of nests of basaloid cells, with peripheral palisading and a haphazard arrangement in the center extending into the dermis, forming nodules.  The tumor cells have hyperchromatic nuclei. Poor cytoplasm and intercellular bridging.      The tumor was excised using standard Mohs technique in 1 stages(s).  CLEAR MARGINS OBTAINED and Final defect size was 1 cm.     We discussed the options for wound management in full with the patient including risks/benefits/ possible outcomes.        REPAIR SECOND INTENT: We discussed the options for wound management in full with the patient including risks/benefits/possible outcomes. Decision made to allow the wound to heal by second intention. Cautery was used for for hemostasis. EBL minimal; complications none; wound care routine.  The patient was discharged in good condition and will return in one month or prn for wound evaluation.  It was a pleasure speaking to Bright Andrew today.  Previous clinic notes and pertinent laboratory tests were reviewed prior to Bright Andrew's visit.  Signs and Symptoms of skin cancer discussed with patient.  Patient encouraged to perform monthly skin  exams.  UV precautions reviewed with patient.  Risks of non-melanoma skin cancer discussed with patient   Return to clinic 6 months

## 2022-05-09 NOTE — LETTER
5/9/2022         RE: Bright Andrew  [address]  [facility] [address]        Dear Colleague,    Thank you for referring your patient, Bright Andrew, to the [facility]. Please see a copy of my visit note below.    Bright Andrew is an extremely pleasant 57 year old year old male patient here today for evaluation and managment of basal cell carcinoma on left helix.  Patient has no other skin complaints today.  Remainder of the HPI, Meds, PMH, Allergies, FH, and SH was reviewed in chart.      Past Medical History:   Diagnosis Date     Basal cell carcinoma      Cellulitis and abscess of unspecified site     recurrent      Chest pain, unspecified        Past Surgical History:   Procedure Laterality Date     COLONOSCOPY N/A 11/20/2015    Procedure: COLONOSCOPY;  Surgeon: Melquiades Street MD;  Location: [facility]        Family History   Problem Relation Age of Onset     C.A.D. Maternal Grandfather      Cardiovascular Maternal Grandfather      Heart Disease Father         congenital heart dz and blockage     Cancer Father         lung     Skin Cancer Father      Skin Cancer Mother        Social History     Socioeconomic History     Marital status:      Spouse name: Not on file     Number of children: Not on file     Years of education: Not on file     Highest education level: Not on file   Occupational History     Not on file   Tobacco Use     Smoking status: Never Smoker     Smokeless tobacco: Never Used   Vaping Use     Vaping Use: Never used   Substance and Sexual Activity     Alcohol use: Yes     Comment: few times a week     Drug use: No     Sexual activity: Yes     Partners: Female     Birth control/protection: None   Other Topics Concern     Parent/sibling w/ CABG, MI or angioplasty before 65F 55M? No   Social History Narrative     Not on file     Social Determinants of Health     Financial Resource Strain: Not on file   Food Insecurity: Not on file   Transportation  "Needs: Not on file   Physical Activity: Not on file   Stress: Not on file   Social Connections: Not on file   Intimate Partner Violence: Not on file   Housing Stability: Not on file       Outpatient Encounter Medications as of 5/9/2022   Medication Sig Dispense Refill     albuterol (PROAIR HFA/PROVENTIL HFA/VENTOLIN HFA) 108 (90 Base) MCG/ACT inhaler Inhale 2 puffs into the lungs every 4 hours as needed for shortness of breath / dyspnea or wheezing Hold until patient calls for refill. (Patient not taking: No sig reported) 18 g 1     aspirin 81 MG tablet Take 81 mg by mouth daily Patient takes every other day       ciclopirox (LOPROX) 0.77 % cream Apply topically 2 times daily 30 g 3     fish oil-omega-3 fatty acids (FISH OIL) 1000 MG capsule Take 1 capsule (1 g) by mouth daily 90 capsule prn     loratadine (CLARITIN) 10 MG tablet Take 10 mg by mouth daily as needed.       simvastatin (ZOCOR) 20 MG tablet Take 1 tablet (20 mg) by mouth At Bedtime Please cancel the 30-day supply Rx. 90 tablet 3     No facility-administered encounter medications on file as of 5/9/2022.             O:   NAD, WDWN, Alert & Oriented, Mood & Affect wnl, Vitals stable   Here today alone   BP (!) 142/90   Pulse 76   Ht 1.88 m (6' 2\")   SpO2 98%   BMI 28.45 kg/m     General appearance normal   Vitals stable   Alert, oriented and in no acute distress     L helix 5mm scaly papule       Eyes: Conjunctivae/lids:Normal     ENT: Lips, buccal mucosa, tongue: normal    MSK:Normal    Cardiovascular: peripheral edema none    Pulm: Breathing Normal    Neuro/Psych: Orientation:Alert and Orientedx3 ; Mood/Affect:normal       A/P:  1. L helix basal cell carcinoma   MOHS:   Location    The rationale for Mohs surgery was discussed with the patient and consent was obtained.  The risks and benefits as well as alternatives to therapy were discussed, in detail.  Specifically, the risks of infection, scarring, bleeding, prolonged wound healing, incomplete " removal, allergy to anesthesia, nerve injury and recurrence were addressed.  Indication for Mohs was Location. Prior to the procedure, the treatment site was clearly identified and, if available, confirmed with previous photos and confirmed by the patient   All components of the Universal Protocol/PAUSE rule were completed.  The Mohs surgeon operated in two distinct and integrated capacities as the surgeon and pathologist.      The area was prepped with Betasept.  A rim of normal appearing skin was marked circumferentially around the lesion.  The area was infiltrated with local anesthesia.  The tumor was first debulked to remove all clinically apparent tumor.  An incision following the standard Mohs approach was done and the specimen was oriented,mapped and placed in 1 block(s).  Each specimen was then chromacoded and processed in the Mohs laboratory using standard Mohs technique and submitted for frozen section histology.  Frozen section analysis showed  residual tumor but CLEAR MARGINS.    First stage:Orthokeratosis of epidermis with a proliferation of nests of basaloid cells, with peripheral palisading and a haphazard arrangement in the center extending into the dermis, forming nodules.  The tumor cells have hyperchromatic nuclei. Poor cytoplasm and intercellular bridging.      The tumor was excised using standard Mohs technique in 1 stages(s).  CLEAR MARGINS OBTAINED and Final defect size was 1 cm.     We discussed the options for wound management in full with the patient including risks/benefits/ possible outcomes.        REPAIR SECOND INTENT: We discussed the options for wound management in full with the patient including risks/benefits/possible outcomes. Decision made to allow the wound to heal by second intention. Cautery was used for for hemostasis. EBL minimal; complications none; wound care routine.  The patient was discharged in good condition and will return in one month or prn for wound evaluation.  It  was a pleasure speaking to Bright Andrew today.  Previous clinic notes and pertinent laboratory tests were reviewed prior to Bright Andrew's visit.  Signs and Symptoms of skin cancer discussed with patient.  Patient encouraged to perform monthly skin exams.  UV precautions reviewed with patient.  Risks of non-melanoma skin cancer discussed with patient   Return to clinic 6 months        Again, thank you for allowing me to participate in the care of your patient.        Sincerely,        Livan Jackson MD

## 2022-05-09 NOTE — PATIENT INSTRUCTIONS
Wound Care Instructions     FOR SUPERFICIAL WOUNDS   Left ear    [facility] [phone]    [facility] [phone]                       AFTER 24 HOURS YOU SHOULD REMOVE THE BANDAGE AND BEGIN DAILY DRESSING CHANGES AS FOLLOWS:     1) Remove Dressing.     2) Clean and dry the area with tap water using a Q-tip or sterile gauze pad.     3) Apply Vaseline, Aquaphor, Polysporin ointment or Bacitracin ointment over entire wound.  Do NOT use Neosporin ointment.     4) Cover the wound with a band-aid, or a sterile non-stick gauze pad and micropore paper tape      REPEAT THESE INSTRUCTIONS AT LEAST ONCE A DAY UNTIL THE WOUND HAS COMPLETELY HEALED.    It is an old wives tale that a wound heals better when it is exposed to air and allowed to dry out. The wound will heal faster with a better cosmetic result if it is kept moist with ointment and covered with a bandage.    **Do not let the wound dry out.**      Supplies Needed:      *Cotton tipped applicators (Q-tips)    *Polysporin Ointment or Bacitracin Ointment (NOT NEOSPORIN)    *Band-aids or non-stick gauze pads and micropore paper tape.      PATIENT INFORMATION:    During the healing process you will notice a number of changes. All wounds develop a small halo of redness surrounding the wound.  This means healing is occurring. Severe itching with extensive redness usually indicates sensitivity to the ointment or bandage tape used to dress the wound.  You should call our office if this develops.      Swelling  and/or discoloration around your surgical site is common, particularly when performed around the eye.    All wounds normally drain.  The larger the wound the more drainage there will be.  After 7-10 days, you will notice the wound beginning to shrink and new skin will begin to grow.  The wound is healed when you can see skin has formed over the entire area.  A healed wound has a healthy, shiny look to the surface and is red to dark pink  in color to normalize.  Wounds may take approximately 4-6 weeks to heal.  Larger wounds may take 6-8 weeks.  After the wound is healed you may discontinue dressing changes.    You may experience a sensation of tightness as your wound heals. This is normal and will gradually subside.    Your healed wound may be sensitive to temperature changes. This sensitivity improves with time, but if you re having a lot of discomfort, try to avoid temperature extremes.    Patients frequently experience itching after their wound appears to have healed because of the continue healing under the skin.  Plain Vaseline will help relieve the itching.        POSSIBLE COMPLICATIONS    BLEEDING:    Leave the bandage in place.  Use tightly rolled up gauze or a cloth to apply direct pressure over the bandage for 30  minutes.  Reapply pressure for an additional 30 minutes if necessary  Use additional gauze and tape to maintain pressure once the bleeding has stopped.

## 2022-05-10 NOTE — TELEPHONE ENCOUNTER
Patient asking for Lab orders to be placed for appointment in July. He stated he will not have drawn until day before appt. Please review orders I loaded for you.   Mariana Juarez RN

## 2022-07-11 ENCOUNTER — LAB (OUTPATIENT)
Dept: LAB | Facility: CLINIC | Age: 57
End: 2022-07-11
Payer: COMMERCIAL

## 2022-07-11 DIAGNOSIS — Z00.00 ROUTINE GENERAL MEDICAL EXAMINATION AT A HEALTH CARE FACILITY: ICD-10-CM

## 2022-07-11 DIAGNOSIS — Z12.5 SCREENING FOR PROSTATE CANCER: ICD-10-CM

## 2022-07-11 DIAGNOSIS — E78.2 MIXED HYPERLIPIDEMIA: Primary | ICD-10-CM

## 2022-07-11 LAB
CHOLEST SERPL-MCNC: 211 MG/DL
FASTING STATUS PATIENT QL REPORTED: YES
FASTING STATUS PATIENT QL REPORTED: YES
GLUCOSE BLD-MCNC: 101 MG/DL (ref 70–99)
HDLC SERPL-MCNC: 47 MG/DL
LDLC SERPL CALC-MCNC: 128 MG/DL
NONHDLC SERPL-MCNC: 164 MG/DL
PSA SERPL-MCNC: 1.26 UG/L (ref 0–4)
TRIGL SERPL-MCNC: 179 MG/DL

## 2022-07-11 PROCEDURE — 82947 ASSAY GLUCOSE BLOOD QUANT: CPT

## 2022-07-11 PROCEDURE — 80061 LIPID PANEL: CPT

## 2022-07-11 PROCEDURE — 36415 COLL VENOUS BLD VENIPUNCTURE: CPT

## 2022-07-11 PROCEDURE — G0103 PSA SCREENING: HCPCS

## 2022-07-11 NOTE — RESULT ENCOUNTER NOTE
The 10-year ASCVD risk score (Jarrett OROZCO Jr., et al., 2013) is: 8.9%    Values used to calculate the score:      Age: 57 years      Sex: Male      Is Non- : No      Diabetic: No      Tobacco smoker: No      Systolic Blood Pressure: 142 mmHg      Is BP treated: No      HDL Cholesterol: 47 mg/dL      Total Cholesterol: 211 mg/dL

## 2022-08-10 ASSESSMENT — ENCOUNTER SYMPTOMS
FEVER: 0
CONSTIPATION: 0
PALPITATIONS: 0
SHORTNESS OF BREATH: 0
CHILLS: 0
JOINT SWELLING: 0
COUGH: 0
EYE PAIN: 0
HEMATURIA: 0
HEADACHES: 0
SORE THROAT: 0
MYALGIAS: 0
WEAKNESS: 0
HEARTBURN: 0
ABDOMINAL PAIN: 0
DIARRHEA: 0
HEMATOCHEZIA: 0
ARTHRALGIAS: 0
NERVOUS/ANXIOUS: 0
DYSURIA: 0
PARESTHESIAS: 0
DIZZINESS: 0
NAUSEA: 0
FREQUENCY: 0

## 2022-08-11 ENCOUNTER — OFFICE VISIT (OUTPATIENT)
Dept: FAMILY MEDICINE | Facility: CLINIC | Age: 57
End: 2022-08-11
Payer: COMMERCIAL

## 2022-08-11 VITALS
TEMPERATURE: 97.8 F | WEIGHT: 222.6 LBS | SYSTOLIC BLOOD PRESSURE: 134 MMHG | HEART RATE: 81 BPM | DIASTOLIC BLOOD PRESSURE: 84 MMHG | BODY MASS INDEX: 28.57 KG/M2 | HEIGHT: 74 IN | OXYGEN SATURATION: 97 % | RESPIRATION RATE: 16 BRPM

## 2022-08-11 DIAGNOSIS — J98.01 BRONCHOSPASM: ICD-10-CM

## 2022-08-11 DIAGNOSIS — J20.9 ACUTE BRONCHITIS WITH SYMPTOMS > 10 DAYS: ICD-10-CM

## 2022-08-11 DIAGNOSIS — Z00.00 ROUTINE GENERAL MEDICAL EXAMINATION AT A HEALTH CARE FACILITY: Primary | ICD-10-CM

## 2022-08-11 DIAGNOSIS — E78.2 MIXED HYPERLIPIDEMIA: ICD-10-CM

## 2022-08-11 PROCEDURE — 99213 OFFICE O/P EST LOW 20 MIN: CPT | Mod: 25 | Performed by: FAMILY MEDICINE

## 2022-08-11 PROCEDURE — 99396 PREV VISIT EST AGE 40-64: CPT | Performed by: FAMILY MEDICINE

## 2022-08-11 RX ORDER — SIMVASTATIN 20 MG
20 TABLET ORAL AT BEDTIME
Qty: 90 TABLET | Refills: 3 | Status: SHIPPED | OUTPATIENT
Start: 2022-08-11 | End: 2023-08-15

## 2022-08-11 RX ORDER — BENZONATATE 200 MG/1
200 CAPSULE ORAL EVERY 8 HOURS PRN
Qty: 21 CAPSULE | Refills: 0 | Status: SHIPPED | OUTPATIENT
Start: 2022-08-11 | End: 2023-08-15

## 2022-08-11 RX ORDER — ALBUTEROL SULFATE 90 UG/1
2 AEROSOL, METERED RESPIRATORY (INHALATION) EVERY 4 HOURS PRN
Qty: 18 G | Refills: 0 | Status: SHIPPED | OUTPATIENT
Start: 2022-08-11

## 2022-08-11 ASSESSMENT — ENCOUNTER SYMPTOMS
HEADACHES: 0
SHORTNESS OF BREATH: 0
HEMATOCHEZIA: 0
EYE PAIN: 0
NAUSEA: 0
DIZZINESS: 0
ABDOMINAL PAIN: 0
FREQUENCY: 0
NERVOUS/ANXIOUS: 0
HEARTBURN: 0
MYALGIAS: 0
CONSTIPATION: 0
JOINT SWELLING: 0
FEVER: 0
CHILLS: 0
HEMATURIA: 0
WEAKNESS: 0
DIARRHEA: 0
DYSURIA: 0
ARTHRALGIAS: 0
SORE THROAT: 0
COUGH: 0
PALPITATIONS: 0
PARESTHESIAS: 0

## 2022-08-11 ASSESSMENT — PAIN SCALES - GENERAL: PAINLEVEL: NO PAIN (0)

## 2022-08-11 NOTE — PROGRESS NOTES
SUBJECTIVE:   CC: Bright Andrew is an 57 year old male who presents for preventative health visit.     Patient has been advised of split billing requirements and indicates understanding: Yes  Healthy Habits:     Getting at least 3 servings of Calcium per day:  Yes    Bi-annual eye exam:  Yes    Dental care twice a year:  Yes    Sleep apnea or symptoms of sleep apnea:  None    Diet:  Low fat/cholesterol    Frequency of exercise:  2-3 days/week    Duration of exercise:  15-30 minutes    Taking medications regularly:  Yes    Barriers to taking medications:  None    Medication side effects:  Not applicable    PHQ-2 Total Score: 0    Additional concerns today:  No    Medication Followup of simvastatin    Taking Medication as prescribed: yes    Side Effects:  None    Medication Helping Symptoms:  yes    RESPIRATORY SYMPTOMS      Duration: 2 weeks    Description  nasal congestion, cough and chest congestion    Severity: moderate    Accompanying signs and symptoms: denies fever, wheezing, dyspnea, sore throat, chest pain or rash    History (predisposing factors):  Previous episodes of bronchitis lasting up to 2-3 weeks    Precipitating or alleviating factors: None    Therapies tried and outcome:  Nyquil - no relief - patient states he still coughs a lot at night.    Used to be given albuterol for prn use when he gets bronchitis.    He also asks for a cough suppressant.    Today's PHQ-2 Score:   PHQ-2 ( 1999 Pfizer) 8/10/2022   Q1: Little interest or pleasure in doing things 0   Q2: Feeling down, depressed or hopeless 0   PHQ-2 Score 0   PHQ-2 Total Score (12-17 Years)- Positive if 3 or more points; Administer PHQ-A if positive -   Q1: Little interest or pleasure in doing things Not at all   Q2: Feeling down, depressed or hopeless Not at all   PHQ-2 Score 0       Abuse: Current or Past(Physical, Sexual or Emotional)- No  Do you feel safe in your environment? Yes        Social History     Tobacco Use     Smoking status:  Never Smoker     Smokeless tobacco: Never Used   Substance Use Topics     Alcohol use: Yes     Comment: few times a week     If you drink alcohol do you typically have >3 drinks per day or >7 drinks per week? No    Alcohol Use 8/11/2022   Prescreen: >3 drinks/day or >7 drinks/week? -   Prescreen: >3 drinks/day or >7 drinks/week? No       Last PSA:   PSA   Date Value Ref Range Status   07/29/2020 0.98 0 - 4 ug/L Final     Comment:     Assay Method:  Chemiluminescence using Siemens Vista analyzer     Prostate Specific Antigen Screen   Date Value Ref Range Status   07/11/2022 1.26 0.00 - 4.00 ug/L Final       Reviewed orders with patient. Reviewed health maintenance and updated orders accordingly - Yes  Patient Active Problem List   Diagnosis     Hyperlipidemia LDL goal <130     Screening for hypertension     FH: cardiovascular disease     Lumbago     Overweight (BMI 25.0-29.9)     Past Surgical History:   Procedure Laterality Date     COLONOSCOPY N/A 11/20/2015    Procedure: COLONOSCOPY;  Surgeon: Melquiades Street MD;  Location: [facility]       Social History     Tobacco Use     Smoking status: Never Smoker     Smokeless tobacco: Never Used   Substance Use Topics     Alcohol use: Yes     Comment: few times a week     Family History   Problem Relation Age of Onset     C.A.D. Maternal Grandfather      Cardiovascular Maternal Grandfather      Heart Disease Father         congenital heart dz and blockage     Cancer Father         lung     Skin Cancer Father      Skin Cancer Mother          Current Outpatient Medications   Medication Sig Dispense Refill     albuterol (PROAIR HFA/PROVENTIL HFA/VENTOLIN HFA) 108 (90 Base) MCG/ACT inhaler Inhale 2 puffs into the lungs every 4 hours as needed for shortness of breath / dyspnea or wheezing 18 g 0     aspirin 81 MG tablet Take 81 mg by mouth daily Patient takes every other day       benzonatate (TESSALON) 200 MG capsule Take 1 capsule (200 mg) by mouth every 8 hours as needed  for cough 21 capsule 0     loratadine (CLARITIN) 10 MG tablet Take 10 mg by mouth daily as needed.       simvastatin (ZOCOR) 20 MG tablet Take 1 tablet (20 mg) by mouth At Bedtime Please cancel the 30-day supply Rx. 90 tablet 3     fish oil-omega-3 fatty acids (FISH OIL) 1000 MG capsule Take 1 capsule (1 g) by mouth daily (Patient not taking: Reported on 8/11/2022) 90 capsule prn     No Known Allergies    Reviewed and updated as needed this visit by clinical staff   Tobacco  Allergies  Meds   Med Hx  Surg Hx  Fam Hx  Soc Hx          Reviewed and updated as needed this visit by Provider     Meds               Past Medical History:   Diagnosis Date     Basal cell carcinoma      Cellulitis and abscess of unspecified site     recurrent      Chest pain, unspecified       Past Surgical History:   Procedure Laterality Date     COLONOSCOPY N/A 11/20/2015    Procedure: COLONOSCOPY;  Surgeon: Melquiades Street MD;  Location: [facility]       Review of Systems   Constitutional: Negative for chills and fever.   HENT: Negative for congestion, ear pain, hearing loss and sore throat.    Eyes: Negative for pain and visual disturbance.   Respiratory: Negative for cough and shortness of breath.    Cardiovascular: Negative for chest pain, palpitations and peripheral edema.   Gastrointestinal: Negative for abdominal pain, constipation, diarrhea, heartburn, hematochezia and nausea.   Genitourinary: Negative for dysuria, frequency, genital sores, hematuria, impotence, penile discharge and urgency.   Musculoskeletal: Negative for arthralgias, joint swelling and myalgias.   Skin: Negative for rash.   Neurological: Negative for dizziness, weakness, headaches and paresthesias.   Psychiatric/Behavioral: Negative for mood changes. The patient is not nervous/anxious.      Resolving cough and nasal congestion that he started 2 weeks ago.    OBJECTIVE:   /84 (BP Location: Left arm, Patient Position: Sitting, Cuff Size: Adult Large)   " Pulse 81   Temp 97.8  F (36.6  C) (Tympanic)   Resp 16   Ht 1.87 m (6' 1.62\")   Wt 101 kg (222 lb 9.6 oz)   SpO2 97%   BMI 28.87 kg/m      Physical Exam  GENERAL APPEARANCE: overweight, alert and no distress  EYES: pink conj, no icterus, PERRL, EOMI  HENT: ear canals and TM's normal, nose and mouth without ulcers or lesions, oropharynx clear and oral mucous membranes moist  NECK: no adenopathy, no asymmetry, masses, or scars and thyroid normal to palpation  RESP: lungs clear to auscultation - no rales, rhonchi or wheezes  CV: regular rates and rhythm, normal S1 S2, no S3 or S4, no murmur, click or rub, no peripheral edema and peripheral pulses strong  ABDOMEN: soft, nontender, no hepatosplenomegaly, no masses and bowel sounds normal  RECTAL: deferred  MS: no musculoskeletal defects are noted and gait is age appropriate without ataxia  SKIN: no suspicious lesions or rashes  NEURO: Normal strength and tone, sensory exam grossly normal, mentation intact and speech normal    Diagnostic Test Results:  Labs reviewed in Epic    ASSESSMENT/PLAN:   Bright was seen today for physical.    Diagnoses and all orders for this visit:    Routine general medical examination at a health care facility  Up to date on .  Patient deferred covid-19 vaccine booster to another date.    Mixed hyperlipidemia  -     simvastatin (ZOCOR) 20 MG tablet; Take 1 tablet (20 mg) by mouth At Bedtime Please cancel the 30-day supply Rx.  -     OFFICE/OUTPT VISIT,EST,LEVL III  Reinforced heart healthy lifestyle.    Bronchospasm  -     albuterol (PROAIR HFA/PROVENTIL HFA/VENTOLIN HFA) 108 (90 Base) MCG/ACT inhaler; Inhale 2 puffs into the lungs every 4 hours as needed for shortness of breath / dyspnea or wheezing  -     OFFICE/OUTPT VISIT,EST,LEVL III  Not in distress. Not active today.  Advised use of albuterol.  Return precautions discussed and given to patient.     Acute bronchitis with symptoms > 10 days  -     benzonatate (TESSALON) 200 MG " "capsule; Take 1 capsule (200 mg) by mouth every 8 hours as needed for cough  -     albuterol (PROAIR HFA/PROVENTIL HFA/VENTOLIN HFA) 108 (90 Base) MCG/ACT inhaler; Inhale 2 puffs into the lungs every 4 hours as needed for shortness of breath / dyspnea or wheezing  -     OFFICE/OUTPT VISIT,EST,LEVL III  No red flags today. Expect resolution after another 1-2 weeks.  May use benzonatate for cough suppression.  Push oral fluids.  Return precautions discussed and given to patient.    Other orders  -     REVIEW OF HEALTH MAINTENANCE PROTOCOL ORDERS        Patient has been advised of split billing requirements and indicates understanding: Yes    COUNSELING:   Reviewed preventive health counseling, as reflected in patient instructions    Estimated body mass index is 28.87 kg/m  as calculated from the following:    Height as of this encounter: 1.87 m (6' 1.62\").    Weight as of this encounter: 101 kg (222 lb 9.6 oz).     Weight management plan: Discussed healthy diet and exercise guidelines    He reports that he has never smoked. He has never used smokeless tobacco.      Counseling Resources:  ATP IV Guidelines  Pooled Cohorts Equation Calculator  FRAX Risk Assessment  ICSI Preventive Guidelines  Dietary Guidelines for Americans, 2010  USDA's MyPlate  ASA Prophylaxis  Lung CA Screening    Moises Reynolds MD  [facility]  "

## 2022-08-11 NOTE — PATIENT INSTRUCTIONS
Albuterol inhaler 2 puffs every 4 hrs as needed for wheezing or coughing spells or tightness in breathing while active.   Take benzonatate as prescribed as needed for cough.

## 2022-10-04 ENCOUNTER — OFFICE VISIT (OUTPATIENT)
Dept: AUDIOLOGY | Facility: CLINIC | Age: 57
End: 2022-10-04
Payer: COMMERCIAL

## 2022-10-04 DIAGNOSIS — H90.5 SENSORINEURAL HEARING LOSS OF HIGH FREQUENCY: Primary | ICD-10-CM

## 2022-10-04 PROCEDURE — 99207 PR NO CHARGE LOS: CPT | Performed by: AUDIOLOGIST

## 2022-10-04 PROCEDURE — 92557 COMPREHENSIVE HEARING TEST: CPT | Performed by: AUDIOLOGIST

## 2022-10-04 PROCEDURE — 92550 TYMPANOMETRY & REFLEX THRESH: CPT | Performed by: AUDIOLOGIST

## 2022-10-04 NOTE — PROGRESS NOTES
AUDIOLOGY REPORT    SUBJECTIVE:  Bright Andrew is a 57 year old male who was seen in the Audiology Clinic at the [facility] for audiologic evaluation, referred by self.No previous audiograms are available at today's appointment.  The patient reports difficulty hearing in group situations. The patient denies  bilateral tinnitus, bilateral otalgia and bilateral drainage.  The patient reports occupational and recreational noise exposure. They were accompanied today by their self.    OBJECTIVE:  Abuse Screening:  Do you feel unsafe at home or work/school? No  Do you feel threatened by someone? No  Does anyone try to keep you from having contact with others, or doing things outside of your home? No  Physical signs of abuse present? No     Fall Risk Screen:  1. Have you fallen two or more times in the past year? No  2. Have you fallen and had an injury in the past year? No      Otoscopic exam indicates ears are clear of cerumen bilaterally     Pure Tone Thresholds assessed using conventional audiometry with good  reliability from 250-8000 Hz bilaterally using  headphones     RIGHT:  normal sloping to mild and moderate sensorineural hearing loss    LEFT:    normal sloping to mild and moderate sensorineural hearing loss    Tympanogram:    RIGHT: normal eardrum mobility    LEFT:   normal eardrum mobility    Reflexes (reported by stimulus ear):  RIGHT: Ipsilateral is present at normal levels  RIGHT: Contralateral is present at normal levels  LEFT:   Ipsilateral is present at normal levels  LEFT:   Contralateral is present at normal levels      Speech Reception Threshold:    RIGHT: 10 dB HL    LEFT:   10 dB HL  Word Recognition Score:     RIGHT: 100% at 50 dB HL using NU-6 recorded word list.    LEFT:   96% at 50 dB HL using NU-6 recorded word list.      ASSESSMENT:   No diagnosis found.    Today s results were discussed with the patient in detail.     PLAN:  Patient was counseled regarding  hearing loss and impact on communication.   Reviewed need for ear protection when exposed to occupational and recreational noise.  It is recommended that the patient return if notes changes in his hearing.  Please call this clinic with questions regarding these results or recommendations.        Yana MEADE, #[id number]

## 2022-11-19 ENCOUNTER — HEALTH MAINTENANCE LETTER (OUTPATIENT)
Age: 57
End: 2022-11-19

## 2023-02-14 ENCOUNTER — LAB (OUTPATIENT)
Dept: LAB | Facility: CLINIC | Age: 58
End: 2023-02-14
Payer: COMMERCIAL

## 2023-02-14 DIAGNOSIS — E78.2 MIXED HYPERLIPIDEMIA: ICD-10-CM

## 2023-02-14 LAB
CHOLEST SERPL-MCNC: 227 MG/DL
HDLC SERPL-MCNC: 56 MG/DL
LDLC SERPL CALC-MCNC: 142 MG/DL
NONHDLC SERPL-MCNC: 171 MG/DL
TRIGL SERPL-MCNC: 144 MG/DL

## 2023-02-14 PROCEDURE — 80061 LIPID PANEL: CPT

## 2023-02-14 PROCEDURE — 36415 COLL VENOUS BLD VENIPUNCTURE: CPT

## 2023-02-15 DIAGNOSIS — E78.2 MIXED HYPERLIPIDEMIA: Primary | ICD-10-CM

## 2023-05-22 ENCOUNTER — OFFICE VISIT (OUTPATIENT)
Dept: DERMATOLOGY | Facility: CLINIC | Age: 58
End: 2023-05-22
Payer: COMMERCIAL

## 2023-05-22 DIAGNOSIS — L82.1 SEBORRHEIC KERATOSIS: ICD-10-CM

## 2023-05-22 DIAGNOSIS — D22.9 NEVUS: Primary | ICD-10-CM

## 2023-05-22 DIAGNOSIS — D18.01 ANGIOMA OF SKIN: ICD-10-CM

## 2023-05-22 DIAGNOSIS — L81.4 LENTIGO: ICD-10-CM

## 2023-05-22 DIAGNOSIS — Z85.828 HISTORY OF BASAL CELL CARCINOMA (BCC): ICD-10-CM

## 2023-05-22 DIAGNOSIS — D48.5 NEOPLASM OF UNCERTAIN BEHAVIOR OF SKIN: ICD-10-CM

## 2023-05-22 PROCEDURE — 88305 TISSUE EXAM BY PATHOLOGIST: CPT | Performed by: DERMATOLOGY

## 2023-05-22 PROCEDURE — 11102 TANGNTL BX SKIN SINGLE LES: CPT | Performed by: PHYSICIAN ASSISTANT

## 2023-05-22 PROCEDURE — 99213 OFFICE O/P EST LOW 20 MIN: CPT | Mod: 25 | Performed by: PHYSICIAN ASSISTANT

## 2023-05-22 ASSESSMENT — PAIN SCALES - GENERAL: PAINLEVEL: NO PAIN (0)

## 2023-05-22 NOTE — PROGRESS NOTES
HPI:   CC: Bright Andrew is a 58 year old male who presents for Full skin cancer screening to rule out skin cancer.   Last Skin Exam: 1 year ago    1st Baseline: no  Personal HX of Skin Cancer: Yes BCC on the left superior helix 4/2022   Personal HX of Malignant Melanoma: no   Family HX of Skin Cancer / Malignant Melanoma: Yes mother and father both with NMSC  Personal HX of Atypical Moles:   no  Risk factors: sun exposure  New / Changing lesions: no  Social History: Retired 3 years ago; has been traveling a lot. 4 grandchildren.    On review of systems, there are no further skin complaints, patient is feeling otherwise well.  See patient intake sheet.  ROS of the following were done and are negative: Constitutional, Eyes, Ears, Nose,   Mouth, Throat, Cardiovascular, Respiratory, GI, Genitourinary, Musculoskeletal,   Psychiatric, Endocrine, Allergic/Immunologic.      PHYSICAL EXAM:   There were no vitals taken for this visit.  Skin exam performed as follows: Type 2 skin. Mood appropriate  Alert and Oriented X 3. Well developed, well nourished in no distress.  General appearance: Normal  Head including face: Normal  Eyes: conjunctiva and lids: Normal  Mouth: Lips, mucosa: Normal  Neck: Normal  Chest-breast/axillae: Normal  Back: Normal  Abdomen: Normal  Cardiovascular: Exam of peripheral vascular system by observation for swelling, varicosities, edema: Normal  Genitalia: groin, buttocks: Normal  Extremities: digits/nails (clubbing): Normal  Eccrine and Apocrine glands: Normal  Right upper extremity: Normal  Left upper extremity: Normal  Right lower extremity: Normal  Left lower extremity: Normal  Skin: Scalp and body hair: See below    Pt deferred exam of breasts, groin, buttocks: No    Other physical findings:  1. Multiple pigmented macules on extremities and trunk  2. Multiple pigmented macules on face, trunk and extremities  3. Multiple vascular papules on trunk, arms and legs  4. Multiple scattered keratotic  plaques  5. 5 mm pink papule on the right upper back       Except as noted above, no other signs of skin cancer or melanoma.     ASSESSMENT/PLAN:   Benign Full skin cancer screening today. . Patient with history of BCC  Advised on monthly self exams and 1 year  Patient Education: Appropriate brochures given.    Multiple benign appearing nevi on arms, legs and trunk. Discussed ABCDEs of melanoma and sunscreen.   Multiple lentigos on arms, legs and trunk. Advised benign, no treatment needed.  Multiple scattered angiomas. Advised benign, no treatment needed.   Seborrheic keratosis on arms, legs and trunk. Advised benign, no treatment needed.  Neurofibroma r/o other on the right upper back. Shave biopsy performed.  Area cleaned and anesthetized with 1% lidocaine with epinephrine.  Dermablade used to remove the lesion and sent to pathology. Bleeding was cauterized. Pt tolerated procedure well with no complications.               Follow-up: yearly FSE/PRN sooner  CC:   1.) Patient was asked about new and changing moles. YES  2.) Patient received a complete physical skin examination: YES  3.) Patient was counseled to perform a monthly self skin examination: YES  Scribed By: ]Neena Wu, MS, PADaneC

## 2023-05-22 NOTE — PATIENT INSTRUCTIONS
Wound Care Instructions     FOR SUPERFICIAL WOUNDS     [facility]  [phone]                 AFTER 24 HOURS YOU SHOULD REMOVE THE BANDAGE AND BEGIN DAILY DRESSING CHANGES AS FOLLOWS:     1) Remove Dressing.     2) Clean and dry the area with tap water using a Q-tip or sterile gauze pad.     3) Apply Vaseline, Aquaphor, Polysporin ointment or Bacitracin ointment over entire wound.  Do NOT use Neosporin ointment.     4) Cover the wound with a band-aid, or a sterile non-stick gauze pad and micropore paper tape    REPEAT THESE INSTRUCTIONS AT LEAST ONCE A DAY UNTIL THE WOUND HAS COMPLETELY HEALED.    It is an old wives tale that a wound heals better when it is exposed to air and allowed to dry out. The wound will heal faster with a better cosmetic result if it is kept moist with ointment and covered with a bandage.    **Do not let the wound dry out.**    Supplies Needed:      *Cotton tipped applicators (Q-tips)    *Vaseline, Aquaphor, Polysporin or Bacitracin Ointment (NOT NEOSPORIN)    *Band-aids or non-stick gauze pads and micropore paper tape.      PATIENT INFORMATION:    During the healing process you will notice a number of changes. All wounds develop a small halo of redness surrounding the wound.  This means healing is occurring. Severe itching with extensive redness usually indicates sensitivity to the ointment or bandage tape used to dress the wound.  You should call our office if this develops.      Swelling  and/or discoloration around your surgical site is common, particularly when performed around the eye.    All wounds normally drain.  The larger the wound the more drainage there will be.  After 7-10 days, you will notice the wound beginning to shrink and new skin will begin to grow.  The wound is healed when you can see skin has formed over the entire area.  A healed wound has a healthy, shiny look to the surface and is red to dark pink in color to normalize.  Wounds may  take approximately 4-6 weeks to heal.  Larger wounds may take 6-8 weeks.  After the wound is healed you may discontinue dressing changes.    You may experience a sensation of tightness as your wound heals. This is normal and will gradually subside.    Your healed wound may be sensitive to temperature changes. This sensitivity improves with time, but if you re having a lot of discomfort, try to avoid temperature extremes.    Patients frequently experience itching after their wound appears to have healed because of the continue healing under the skin.  Plain Vaseline will help relieve the itching.      POSSIBLE COMPLICATIONS    BLEEDING:    Leave the bandage in place.  Use tightly rolled up gauze or a cloth to apply direct pressure over the bandage for 30  minutes.  Reapply pressure for an additional 30 minutes if necessary  Use additional gauze and tape to maintain pressure once the bleeding has stopped.

## 2023-05-22 NOTE — LETTER
5/22/2023         RE: Bright Andrew  [address]  [facility] [address]        Dear Colleague,    Thank you for referring your patient, Bright Andrew, to the [facility]. Please see a copy of my visit note below.    HPI:   CC: Bright Andrew is a 58 year old male who presents for Full skin cancer screening to rule out skin cancer.   Last Skin Exam: 1 year ago    1st Baseline: no  Personal HX of Skin Cancer: Yes BCC on the left superior helix 4/2022   Personal HX of Malignant Melanoma: no   Family HX of Skin Cancer / Malignant Melanoma: Yes mother and father both with NMSC  Personal HX of Atypical Moles:   no  Risk factors: sun exposure  New / Changing lesions: no  Social History: Retired 3 years ago; has been traveling a lot. 4 grandchildren.    On review of systems, there are no further skin complaints, patient is feeling otherwise well.  See patient intake sheet.  ROS of the following were done and are negative: Constitutional, Eyes, Ears, Nose,   Mouth, Throat, Cardiovascular, Respiratory, GI, Genitourinary, Musculoskeletal,   Psychiatric, Endocrine, Allergic/Immunologic.      PHYSICAL EXAM:   There were no vitals taken for this visit.  Skin exam performed as follows: Type 2 skin. Mood appropriate  Alert and Oriented X 3. Well developed, well nourished in no distress.  General appearance: Normal  Head including face: Normal  Eyes: conjunctiva and lids: Normal  Mouth: Lips, mucosa: Normal  Neck: Normal  Chest-breast/axillae: Normal  Back: Normal  Abdomen: Normal  Cardiovascular: Exam of peripheral vascular system by observation for swelling, varicosities, edema: Normal  Genitalia: groin, buttocks: Normal  Extremities: digits/nails (clubbing): Normal  Eccrine and Apocrine glands: Normal  Right upper extremity: Normal  Left upper extremity: Normal  Right lower extremity: Normal  Left lower extremity: Normal  Skin: Scalp and body hair: See below    Pt deferred exam of breasts,  groin, buttocks: No    Other physical findings:  1. Multiple pigmented macules on extremities and trunk  2. Multiple pigmented macules on face, trunk and extremities  3. Multiple vascular papules on trunk, arms and legs  4. Multiple scattered keratotic plaques  5. 5 mm pink papule on the right upper back       Except as noted above, no other signs of skin cancer or melanoma.     ASSESSMENT/PLAN:   Benign Full skin cancer screening today. . Patient with history of BCC  Advised on monthly self exams and 1 year  Patient Education: Appropriate brochures given.    Multiple benign appearing nevi on arms, legs and trunk. Discussed ABCDEs of melanoma and sunscreen.   Multiple lentigos on arms, legs and trunk. Advised benign, no treatment needed.  Multiple scattered angiomas. Advised benign, no treatment needed.   Seborrheic keratosis on arms, legs and trunk. Advised benign, no treatment needed.  Neurofibroma r/o other on the right upper back. Shave biopsy performed.  Area cleaned and anesthetized with 1% lidocaine with epinephrine.  Dermablade used to remove the lesion and sent to pathology. Bleeding was cauterized. Pt tolerated procedure well with no complications.               Follow-up: yearly FSE/PRN sooner  CC:   1.) Patient was asked about new and changing moles. YES  2.) Patient received a complete physical skin examination: YES  3.) Patient was counseled to perform a monthly self skin examination: YES  Scribed By: ]Neena Wu, MS, PASERVANDO          Again, thank you for allowing me to participate in the care of your patient.        Sincerely,        Neena Wu PA-C

## 2023-07-21 ENCOUNTER — MYC MEDICAL ADVICE (OUTPATIENT)
Dept: FAMILY MEDICINE | Facility: CLINIC | Age: 58
End: 2023-07-21
Payer: COMMERCIAL

## 2023-07-21 DIAGNOSIS — Z12.5 SCREENING FOR PROSTATE CANCER: Primary | ICD-10-CM

## 2023-07-21 NOTE — TELEPHONE ENCOUNTER
Dr Reynolds,     Pended for review/consideration please add any other orders you want drawn.     Nuria Plata MSN, RN

## 2023-08-08 ASSESSMENT — ENCOUNTER SYMPTOMS
SHORTNESS OF BREATH: 0
DYSURIA: 0
NERVOUS/ANXIOUS: 0
ARTHRALGIAS: 0
PALPITATIONS: 0
COUGH: 0
FREQUENCY: 0
NAUSEA: 0
ABDOMINAL PAIN: 0
EYE PAIN: 0
SORE THROAT: 0
HEMATURIA: 0
WEAKNESS: 0
HEARTBURN: 0
JOINT SWELLING: 0
HEADACHES: 0
PARESTHESIAS: 0
CHILLS: 0
HEMATOCHEZIA: 0
DIZZINESS: 0
DIARRHEA: 0
CONSTIPATION: 0
FEVER: 0
MYALGIAS: 0

## 2023-08-11 ENCOUNTER — LAB (OUTPATIENT)
Dept: LAB | Facility: CLINIC | Age: 58
End: 2023-08-11
Payer: COMMERCIAL

## 2023-08-11 DIAGNOSIS — Z12.5 SCREENING FOR PROSTATE CANCER: ICD-10-CM

## 2023-08-11 DIAGNOSIS — E78.2 MIXED HYPERLIPIDEMIA: ICD-10-CM

## 2023-08-11 LAB
CHOLEST SERPL-MCNC: 192 MG/DL
HDLC SERPL-MCNC: 45 MG/DL
LDLC SERPL CALC-MCNC: 125 MG/DL
NONHDLC SERPL-MCNC: 147 MG/DL
PSA SERPL DL<=0.01 NG/ML-MCNC: 1.29 NG/ML (ref 0–3.5)
TRIGL SERPL-MCNC: 109 MG/DL

## 2023-08-11 PROCEDURE — 80061 LIPID PANEL: CPT

## 2023-08-11 PROCEDURE — G0103 PSA SCREENING: HCPCS

## 2023-08-11 PROCEDURE — 36415 COLL VENOUS BLD VENIPUNCTURE: CPT

## 2023-08-15 ENCOUNTER — OFFICE VISIT (OUTPATIENT)
Dept: FAMILY MEDICINE | Facility: CLINIC | Age: 58
End: 2023-08-15
Payer: COMMERCIAL

## 2023-08-15 VITALS
WEIGHT: 217 LBS | TEMPERATURE: 97 F | RESPIRATION RATE: 16 BRPM | HEART RATE: 70 BPM | SYSTOLIC BLOOD PRESSURE: 122 MMHG | OXYGEN SATURATION: 97 % | DIASTOLIC BLOOD PRESSURE: 86 MMHG | BODY MASS INDEX: 28.76 KG/M2 | HEIGHT: 73 IN

## 2023-08-15 DIAGNOSIS — E78.2 MIXED HYPERLIPIDEMIA: ICD-10-CM

## 2023-08-15 DIAGNOSIS — Z00.00 ROUTINE GENERAL MEDICAL EXAMINATION AT A HEALTH CARE FACILITY: Primary | ICD-10-CM

## 2023-08-15 PROCEDURE — 99213 OFFICE O/P EST LOW 20 MIN: CPT | Mod: 25 | Performed by: FAMILY MEDICINE

## 2023-08-15 PROCEDURE — 99396 PREV VISIT EST AGE 40-64: CPT | Performed by: FAMILY MEDICINE

## 2023-08-15 RX ORDER — SIMVASTATIN 20 MG
20 TABLET ORAL AT BEDTIME
Qty: 90 TABLET | Refills: 3 | Status: SHIPPED | OUTPATIENT
Start: 2023-08-15 | End: 2024-08-12

## 2023-08-15 ASSESSMENT — ENCOUNTER SYMPTOMS
DIARRHEA: 0
DYSURIA: 0
HEARTBURN: 0
CHILLS: 0
HEADACHES: 0
HEMATURIA: 0
HEMATOCHEZIA: 0
NAUSEA: 0
PALPITATIONS: 0
CONSTIPATION: 0
SORE THROAT: 0
FEVER: 0
MYALGIAS: 0
FREQUENCY: 0
JOINT SWELLING: 0
NERVOUS/ANXIOUS: 0
DIZZINESS: 0
ARTHRALGIAS: 0
PARESTHESIAS: 0
WEAKNESS: 0
ABDOMINAL PAIN: 0
SHORTNESS OF BREATH: 0
COUGH: 0
EYE PAIN: 0

## 2023-08-15 ASSESSMENT — PAIN SCALES - GENERAL: PAINLEVEL: NO PAIN (0)

## 2023-08-15 NOTE — PATIENT INSTRUCTIONS
Preventive Health Recommendations  Male Ages 50 - 64    Yearly exam:             See your health care provider every year in order to  o   Review health changes.   o   Discuss preventive care.    o   Review your medicines if your doctor has prescribed any.   Have a cholesterol test every 5 years, or more frequently if you are at risk for high cholesterol/heart disease.   Have a diabetes test (fasting glucose) every three years. If you are at risk for diabetes, you should have this test more often.   Have a colonoscopy at age 50, or have a yearly FIT test (stool test). These exams will check for colon cancer.    Talk with your health care provider about whether or not a prostate cancer screening test (PSA) is right for you.  You should be tested each year for STDs (sexually transmitted diseases), if you re at risk.     Shots: Get a flu shot each year. Get a tetanus shot every 10 years.     Nutrition:  Eat at least 5 servings of fruits and vegetables daily.   Eat whole-grain bread, whole-wheat pasta and brown rice instead of white grains and rice.   Get adequate Calcium and Vitamin D.     Lifestyle  Exercise for at least 150 minutes a week (30 minutes a day, 5 days a week). This will help you control your weight and prevent disease.   Limit alcohol to one drink per day.   No smoking.   Wear sunscreen to prevent skin cancer.   See your dentist every six months for an exam and cleaning.   See your eye doctor every 1 to 2 years.    Preventive Health Recommendations  Male Ages 50 - 64    Yearly exam:             See your health care provider every year in order to  o   Review health changes.   o   Discuss preventive care.    o   Review your medicines if your doctor has prescribed any.   Have a cholesterol test every 5 years, or more frequently if you are at risk for high cholesterol/heart disease.   Have a diabetes test (fasting glucose) every three years. If you are at risk for diabetes, you should have this test more  often.   Have a colonoscopy at age 50, or have a yearly FIT test (stool test). These exams will check for colon cancer.    Talk with your health care provider about whether or not a prostate cancer screening test (PSA) is right for you.  You should be tested each year for STDs (sexually transmitted diseases), if you re at risk.     Shots: Get a flu shot each year. Get a tetanus shot every 10 years.     Nutrition:  Eat at least 5 servings of fruits and vegetables daily.   Eat whole-grain bread, whole-wheat pasta and brown rice instead of white grains and rice.   Get adequate Calcium and Vitamin D.     Lifestyle  Exercise for at least 150 minutes a week (30 minutes a day, 5 days a week). This will help you control your weight and prevent disease.   Limit alcohol to one drink per day.   No smoking.   Wear sunscreen to prevent skin cancer.   See your dentist every six months for an exam and cleaning.   See your eye doctor every 1 to 2 years.

## 2023-08-15 NOTE — PROGRESS NOTES
SUBJECTIVE:   CC: Bright is an 58 year old who presents for preventative health visit.       8/15/2023     8:43 AM   Additional Questions   Roomed by Karina Miller CMA       Healthy Habits:     Getting at least 3 servings of Calcium per day:  Yes    Bi-annual eye exam:  Yes    Dental care twice a year:  Yes    Sleep apnea or symptoms of sleep apnea:  None    Diet:  Low fat/cholesterol    Frequency of exercise:  2-3 days/week    Duration of exercise:  15-30 minutes    Taking medications regularly:  Yes    Medication side effects:  None    Additional concerns today:  No    Chief Complaint   Patient presents with    Physical     General physical exam.    Lab Result Notice     He had lab levels checked on 8-11-23.    Imm/Inj     He will wait on the tetanus injection at this time.  Mentioned to him that he can make an appointment on the [facility] schedule when ready.       Hyperlipidemia Follow-Up    Are you regularly taking any medication or supplement to lower your cholesterol?   Yes- Simvastatin  Are you having muscle aches or other side effects that you think could be caused by your cholesterol lowering medication?  No  Lipids have improved.  Denies ADR to statin. Needs refill.  Patient denies chest pain, abd pain, dyspnea, jaundice, nausea, or BM changes.     PSA is normal.    Social History     Tobacco Use    Smoking status: Never    Smokeless tobacco: Never   Substance Use Topics    Alcohol use: Yes     Comment: few times a week             8/8/2023     7:49 AM   Alcohol Use   Prescreen: >3 drinks/day or >7 drinks/week? No       Last PSA:   PSA   Date Value Ref Range Status   07/29/2020 0.98 0 - 4 ug/L Final     Comment:     Assay Method:  Chemiluminescence using Siemens Vista analyzer     Prostate Specific Antigen Screen   Date Value Ref Range Status   08/11/2023 1.29 0.00 - 3.50 ng/mL Final   07/11/2022 1.26 0.00 - 4.00 ug/L Final       Reviewed orders with patient. Reviewed health maintenance and updated orders  accordingly - Yes  Patient Active Problem List   Diagnosis    Hyperlipidemia LDL goal <130    Screening for hypertension    FH: cardiovascular disease    Lumbago    Overweight (BMI 25.0-29.9)     Past Surgical History:   Procedure Laterality Date    COLONOSCOPY N/A 11/20/2015    Procedure: COLONOSCOPY;  Surgeon: Melquiades Street MD;  Location: [facility]       Social History     Tobacco Use    Smoking status: Never    Smokeless tobacco: Never   Substance Use Topics    Alcohol use: Yes     Comment: few times a week     Family History   Problem Relation Age of Onset    C.A.D. Maternal Grandfather     Cardiovascular Maternal Grandfather     Heart Disease Father         congenital heart dz and blockage    Cancer Father         lung    Skin Cancer Father     Skin Cancer Mother          Current Outpatient Medications   Medication Sig Dispense Refill    aspirin 81 MG tablet Take 81 mg by mouth daily Patient takes every other day      loratadine (CLARITIN) 10 MG tablet Take 10 mg by mouth daily as needed.      simvastatin (ZOCOR) 20 MG tablet Take 1 tablet (20 mg) by mouth At Bedtime 90 tablet 3    albuterol (PROAIR HFA/PROVENTIL HFA/VENTOLIN HFA) 108 (90 Base) MCG/ACT inhaler Inhale 2 puffs into the lungs every 4 hours as needed for shortness of breath / dyspnea or wheezing (Patient not taking: Reported on 8/15/2023) 18 g 0     No Known Allergies    Reviewed and updated as needed this visit by clinical staff   Tobacco  Allergies  Meds              Reviewed and updated as needed this visit by Provider     Meds             Past Medical History:   Diagnosis Date    Basal cell carcinoma     Cellulitis and abscess of unspecified site     recurrent     Chest pain, unspecified       Past Surgical History:   Procedure Laterality Date    COLONOSCOPY N/A 11/20/2015    Procedure: COLONOSCOPY;  Surgeon: Melquiades Street MD;  Location: [facility]       Review of Systems   Constitutional:  Negative for chills and fever.   HENT:   "Negative for congestion, ear pain, hearing loss and sore throat.    Eyes:  Negative for pain and visual disturbance.   Respiratory:  Negative for cough and shortness of breath.    Cardiovascular:  Negative for chest pain, palpitations and peripheral edema.   Gastrointestinal:  Negative for abdominal pain, constipation, diarrhea, heartburn, hematochezia and nausea.   Genitourinary:  Negative for dysuria, frequency, genital sores, hematuria, impotence, penile discharge and urgency.   Musculoskeletal:  Negative for arthralgias, joint swelling and myalgias.   Skin:  Negative for rash.   Neurological:  Negative for dizziness, weakness, headaches and paresthesias.   Psychiatric/Behavioral:  Negative for mood changes. The patient is not nervous/anxious.        OBJECTIVE:   /86 (BP Location: Left arm, Patient Position: Chair, Cuff Size: Adult Large)   Pulse 70   Temp 97  F (36.1  C) (Tympanic)   Resp 16   Ht 1.861 m (6' 1.25\")   Wt 98.4 kg (217 lb)   SpO2 97%   BMI 28.43 kg/m      Physical Exam  GENERAL APPEARANCE: overweight, alert and no distress  EYES: pink conj, no icterus, PERRL, EOMI  HENT: ear canals and TM's normal, nose and mouth without ulcers or lesions, oropharynx clear and oral mucous membranes moist  NECK: no adenopathy, no asymmetry, masses, or scars and thyroid normal to palpation  RESP: lungs clear to auscultation - no rales, rhonchi or wheezes  CV: regular rates and rhythm, normal S1 S2, no S3 or S4, no murmur, click or rub, no peripheral edema and peripheral pulses strong  ABDOMEN: soft, nontender, no hepatosplenomegaly, no masses and bowel sounds normal  RECTAL: deferred  MS: no musculoskeletal defects are noted and gait is age appropriate without ataxia  SKIN: no suspicious lesions or rashes  NEURO: Normal strength and tone, sensory exam grossly normal, mentation intact and speech normal     Diagnostic Test Results:  Labs reviewed in Epic  No results found for any visits on " "08/15/23.    Component      Latest Ref Rng 8/11/2023  8:20 AM   Cholesterol      <200 mg/dL 192    Triglycerides      <150 mg/dL 109    HDL Cholesterol      >=40 mg/dL 45    LDL Cholesterol Calculated      <=100 mg/dL 125 (H)    Non HDL Cholesterol      <130 mg/dL 147 (H)    PSA Tumor Marker      0.00 - 3.50 ng/mL 1.29       Legend:  (H) High    ASSESSMENT/PLAN:   Bright was seen today for physical, lab result notice and imm/inj.    Diagnoses and all orders for this visit:    Routine general medical examination at a health care facility    Mixed hyperlipidemia  -     simvastatin (ZOCOR) 20 MG tablet; Take 1 tablet (20 mg) by mouth At Bedtime  -     OFFICE/OUTPT VISIT,EST,LEVL III  Reinforced heart healthy lifestyle.  Slighlty above desirable LDL level but all other cholesterol levels normal.    Other orders  -     REVIEW OF HEALTH MAINTENANCE PROTOCOL ORDERS        Patient has been advised of split billing requirements and indicates understanding: Yes      COUNSELING:   Reviewed preventive health counseling, as reflected in patient instructions      BMI:   Estimated body mass index is 28.43 kg/m  as calculated from the following:    Height as of this encounter: 1.861 m (6' 1.25\").    Weight as of this encounter: 98.4 kg (217 lb).   Weight management plan: Discussed healthy diet and exercise guidelines      He reports that he has never smoked. He has never used smokeless tobacco.            Moises Reynolds MD  [facility]  "

## 2023-11-02 ENCOUNTER — MYC MEDICAL ADVICE (OUTPATIENT)
Dept: FAMILY MEDICINE | Facility: CLINIC | Age: 58
End: 2023-11-02
Payer: COMMERCIAL

## 2023-11-03 NOTE — TELEPHONE ENCOUNTER
Patient called to discuss symptoms. He has had a cough and nasal congestion for about a week. Coughing up thick yellow sputum. Has taken a COVID home test that was negative. Wife is also experiencing similar symptoms at home. Patient denies fever, shortness of breath, chest pain, or any other concerning symptoms at this time. Assisted patient in scheduling virtual visit on Monday. Patient advised that if his symptoms worsen, he develops any breathing difficulty, or chest pain to seek care sooner in ED. Patient in agreement with plan.     Rin Rader RN

## 2024-01-22 ENCOUNTER — OFFICE VISIT (OUTPATIENT)
Dept: PODIATRY | Facility: CLINIC | Age: 59
End: 2024-01-22
Payer: COMMERCIAL

## 2024-01-22 VITALS
HEART RATE: 81 BPM | HEIGHT: 73 IN | SYSTOLIC BLOOD PRESSURE: 169 MMHG | WEIGHT: 217 LBS | BODY MASS INDEX: 28.76 KG/M2 | DIASTOLIC BLOOD PRESSURE: 82 MMHG

## 2024-01-22 DIAGNOSIS — M76.821 POSTERIOR TIBIAL TENDON DYSFUNCTION (PTTD) OF BOTH LOWER EXTREMITIES: ICD-10-CM

## 2024-01-22 DIAGNOSIS — M21.40 ACQUIRED PES PLANOVALGUS, UNSPECIFIED LATERALITY: Primary | ICD-10-CM

## 2024-01-22 DIAGNOSIS — M76.822 POSTERIOR TIBIAL TENDON DYSFUNCTION (PTTD) OF BOTH LOWER EXTREMITIES: ICD-10-CM

## 2024-01-22 PROCEDURE — 99203 OFFICE O/P NEW LOW 30 MIN: CPT | Performed by: PODIATRIST

## 2024-01-22 ASSESSMENT — PAIN SCALES - GENERAL: PAINLEVEL: MODERATE PAIN (4)

## 2024-01-22 NOTE — PROGRESS NOTES
"PATIENT HISTORY:  Bright Andrew is a 58 year old male who presents to clinic as a self referral with a chief complaint of old orthotics causing right ankle pain.  The patient is seen by themselves.  The patient relates the pain is primarily located around the lateral side of right foot and ankle.  Patient states he has flat feet.  The patient relates that the symptoms have been going on for several week(s).  The patient has previously tried ibuprofen with little relief.  The patient is retired.  Any previous notes and studies that pertain to the patient's condition were reviewed.    Pertinent medical, surgical and family history was reviewed in the [facility] chart.    Past Medical History:   Past Medical History:   Diagnosis Date    Basal cell carcinoma     Cellulitis and abscess of unspecified site     recurrent     Chest pain, unspecified        Medications:   Current Outpatient Medications:     aspirin 81 MG tablet, Take 81 mg by mouth daily Patient takes every other day, Disp: , Rfl:     loratadine (CLARITIN) 10 MG tablet, Take 10 mg by mouth daily as needed., Disp: , Rfl:     simvastatin (ZOCOR) 20 MG tablet, Take 1 tablet (20 mg) by mouth At Bedtime, Disp: 90 tablet, Rfl: 3    albuterol (PROAIR HFA/PROVENTIL HFA/VENTOLIN HFA) 108 (90 Base) MCG/ACT inhaler, Inhale 2 puffs into the lungs every 4 hours as needed for shortness of breath / dyspnea or wheezing (Patient not taking: Reported on 8/15/2023), Disp: 18 g, Rfl: 0     Allergies:  No Known Allergies    Vitals: BP (!) 169/82   Pulse 81   Ht 1.861 m (6' 1.25\")   Wt 98.4 kg (217 lb)   BMI 28.43 kg/m    BMI= Body mass index is 28.43 kg/m .    LOWER EXTREMITY PHYSICAL EXAM    Dermatologic: Skin is intact to right and left lower extremity without significant lesions, rash or abrasion.        Vascular: DP & PT pulses are intact & regular on the right and left.   CFT and skin temperature is normal to the right and left lower extremity.     Neurologic: Lower " extremity sensation is intact to light touch.  No evidence of weakness in the right and left lower extremity.        Musculoskeletal: Patient is ambulatory without assistive device or brace.  No gross ankle deformity noted.  No foot or ankle joint effusion is noted.  Noted collapse of the medial longitudinal arch with forefoot abduction bilaterally upon weight bearing exam.  Noted positive Miguel's test bilaterally.  Noted tight gastroc complex bilaterally.  Noted pain on palpation over the Posterior Tibial tendon bilaterally.             ASSESSMENT / PLAN:     ICD-10-CM    1. Acquired pes planovalgus, unspecified laterality  M21.40 Orthotics and Prosthetics Order Orthotic; Foot Orthotics (functional rigid support); Bilateral      2. Posterior tibial tendon dysfunction (PTTD) of both lower extremities  M76.821 Orthotics and Prosthetics Order Orthotic; Foot Orthotics (functional rigid support); Bilateral    M76.822           I have explained to Bright about the conditions.  We discussed the underlying contributing factors to the condition as well as both conservative and surgical treatment options along with expected length of recovery.  At this time, the patient was educated on the importance of offloading supportive shoes and other devices.  I demonstrated to the patient calf stretches to perform every hour daily until symptoms resolve.  After symptoms resolve, the patient was advised to perform the stretches 3 times daily to prevent future recurrence.  The patient was instructed to perform warm soaks with Epson salt after which to also apply over-the-counter Voltaren gel to deeply massage the injured tissue.  The patient was instructed to do this on a daily basis until symptoms resolve.  The patient was prescribed custom orthotics that will aid in offloading the tension forces to the soft tissues and prevent further inflammation.    The patient may return in four weeks for reevaluation to determine if any further  treatment will be needed.      Bright verbalized agreement with and understanding of the rational for the diagnosis and treatment plan.  All questions were answered to best of my ability and the patient's satisfaction. The patient was advised to contact the clinic with any questions that may arise after the clinic visit.      Disclaimer: This note consists of symbols derived from keyboarding, dictation and/or voice recognition software. As a result, there may be errors in the script that have gone undetected. Please consider this when interpreting information found in this chart.       HCON Singh D.P.M., [email or web address].[email or web address].F.A.S.

## 2024-01-22 NOTE — PATIENT INSTRUCTIONS

## 2024-01-22 NOTE — LETTER
"    1/22/2024         RE: Bright Andrew  [address] [facility] [address]        Dear Colleague,    Thank you for referring your patient, Bright Andrew, to the [facility] [place]. Please see a copy of my visit note below.    PATIENT HISTORY:  Bright Andrew is a 58 year old male who presents to clinic as a self referral with a chief complaint of old orthotics causing right ankle pain.  The patient is seen by themselves.  The patient relates the pain is primarily located around the lateral side of right foot and ankle.  Patient states he has flat feet.  The patient relates that the symptoms have been going on for several week(s).  The patient has previously tried ibuprofen with little relief.  The patient is retired.  Any previous notes and studies that pertain to the patient's condition were reviewed.    Pertinent medical, surgical and family history was reviewed in the [facility] chart.    Past Medical History:   Past Medical History:   Diagnosis Date     Basal cell carcinoma      Cellulitis and abscess of unspecified site     recurrent      Chest pain, unspecified        Medications:   Current Outpatient Medications:      aspirin 81 MG tablet, Take 81 mg by mouth daily Patient takes every other day, Disp: , Rfl:      loratadine (CLARITIN) 10 MG tablet, Take 10 mg by mouth daily as needed., Disp: , Rfl:      simvastatin (ZOCOR) 20 MG tablet, Take 1 tablet (20 mg) by mouth At Bedtime, Disp: 90 tablet, Rfl: 3     albuterol (PROAIR HFA/PROVENTIL HFA/VENTOLIN HFA) 108 (90 Base) MCG/ACT inhaler, Inhale 2 puffs into the lungs every 4 hours as needed for shortness of breath / dyspnea or wheezing (Patient not taking: Reported on 8/15/2023), Disp: 18 g, Rfl: 0     Allergies:  No Known Allergies    Vitals: BP (!) 169/82   Pulse 81   Ht 1.861 m (6' 1.25\")   Wt 98.4 kg (217 lb)   BMI 28.43 kg/m    BMI= Body mass index is 28.43 kg/m .    LOWER EXTREMITY PHYSICAL EXAM    Dermatologic: Skin " is intact to right and left lower extremity without significant lesions, rash or abrasion.        Vascular: DP & PT pulses are intact & regular on the right and left.   CFT and skin temperature is normal to the right and left lower extremity.     Neurologic: Lower extremity sensation is intact to light touch.  No evidence of weakness in the right and left lower extremity.        Musculoskeletal: Patient is ambulatory without assistive device or brace.  No gross ankle deformity noted.  No foot or ankle joint effusion is noted.  Noted collapse of the medial longitudinal arch with forefoot abduction bilaterally upon weight bearing exam.  Noted positive Miguel's test bilaterally.  Noted tight gastroc complex bilaterally.  Noted pain on palpation over the Posterior Tibial tendon bilaterally.             ASSESSMENT / PLAN:     ICD-10-CM    1. Acquired pes planovalgus, unspecified laterality  M21.40 Orthotics and Prosthetics Order Orthotic; Foot Orthotics (functional rigid support); Bilateral      2. Posterior tibial tendon dysfunction (PTTD) of both lower extremities  M76.821 Orthotics and Prosthetics Order Orthotic; Foot Orthotics (functional rigid support); Bilateral    M76.822           I have explained to Bright about the conditions.  We discussed the underlying contributing factors to the condition as well as both conservative and surgical treatment options along with expected length of recovery.  At this time, the patient was educated on the importance of offloading supportive shoes and other devices.  I demonstrated to the patient calf stretches to perform every hour daily until symptoms resolve.  After symptoms resolve, the patient was advised to perform the stretches 3 times daily to prevent future recurrence.  The patient was instructed to perform warm soaks with Epson salt after which to also apply over-the-counter Voltaren gel to deeply massage the injured tissue.  The patient was instructed to do this on a daily  basis until symptoms resolve.  The patient was prescribed custom orthotics that will aid in offloading the tension forces to the soft tissues and prevent further inflammation.    The patient may return in four weeks for reevaluation to determine if any further treatment will be needed.      Bright verbalized agreement with and understanding of the rational for the diagnosis and treatment plan.  All questions were answered to best of my ability and the patient's satisfaction. The patient was advised to contact the clinic with any questions that may arise after the clinic visit.      Disclaimer: This note consists of symbols derived from keyboarding, dictation and/or voice recognition software. As a result, there may be errors in the script that have gone undetected. Please consider this when interpreting information found in this chart.       JONELLE Mcmahon.P.VIPIN., F.A.C.F.A.S.      Again, thank you for allowing me to participate in the care of your patient.        Sincerely,        Joesph Singh DPM

## 2024-01-22 NOTE — NURSING NOTE
"Chief Complaint   Patient presents with    Consult     New orthotics- other pair is 5- 6 years old       Initial BP (!) 169/82   Pulse 81   Ht 1.861 m (6' 1.25\")   Wt 98.4 kg (217 lb)   BMI 28.43 kg/m   Estimated body mass index is 28.43 kg/m  as calculated from the following:    Height as of this encounter: 1.861 m (6' 1.25\").    Weight as of this encounter: 98.4 kg (217 lb).  Medications and allergies reviewed.      Deandra SAL MA    "

## 2024-05-23 ENCOUNTER — OFFICE VISIT (OUTPATIENT)
Dept: DERMATOLOGY | Facility: CLINIC | Age: 59
End: 2024-05-23
Payer: COMMERCIAL

## 2024-05-23 DIAGNOSIS — D22.9 NEVUS: Primary | ICD-10-CM

## 2024-05-23 DIAGNOSIS — D18.01 ANGIOMA OF SKIN: ICD-10-CM

## 2024-05-23 DIAGNOSIS — Z85.828 HISTORY OF BASAL CELL CARCINOMA (BCC): ICD-10-CM

## 2024-05-23 DIAGNOSIS — L82.1 SEBORRHEIC KERATOSIS: ICD-10-CM

## 2024-05-23 DIAGNOSIS — L81.4 LENTIGO: ICD-10-CM

## 2024-05-23 PROCEDURE — 99213 OFFICE O/P EST LOW 20 MIN: CPT | Performed by: PHYSICIAN ASSISTANT

## 2024-05-23 NOTE — PROGRESS NOTES
HPI:   CC: Bright Andrew is a 59 year old male who presents for Full skin cancer screening to rule out skin cancer.   Last Skin Exam: 1 year ago    1st Baseline: no  Personal HX of Skin Cancer: Yes BCC on the left superior helix 4/2022   Personal HX of Malignant Melanoma: no   Family HX of Skin Cancer / Malignant Melanoma: Yes mother and father both with NMSC  Personal HX of Atypical Moles:   no  Risk factors: sun exposure  New / Changing lesions: no  Social History: Retired 4 years ago; has been traveling a lot. 4 grandchildren.  Went to [place] for 6 weeks over the winter. Will travel to [place] in the fall.   On review of systems, there are no further skin complaints, patient is feeling otherwise well.  See patient intake sheet.  ROS of the following were done and are negative: Constitutional, Eyes, Ears, Nose,   Mouth, Throat, Cardiovascular, Respiratory, GI, Genitourinary, Musculoskeletal,   Psychiatric, Endocrine, Allergic/Immunologic.      PHYSICAL EXAM:   There were no vitals taken for this visit.  Skin exam performed as follows: Type 2 skin. Mood appropriate  Alert and Oriented X 3. Well developed, well nourished in no distress.  General appearance: Normal  Head including face: Normal  Eyes: conjunctiva and lids: Normal  Mouth: Lips, mucosa: Normal  Neck: Normal  Chest-breast/axillae: Normal  Back: Normal  Abdomen: Normal  Cardiovascular: Exam of peripheral vascular system by observation for swelling, varicosities, edema: Normal  Genitalia: groin, buttocks: Normal  Extremities: digits/nails (clubbing): Normal  Eccrine and Apocrine glands: Normal  Right upper extremity: Normal  Left upper extremity: Normal  Right lower extremity: Normal  Left lower extremity: Normal  Skin: Scalp and body hair: See below    Pt deferred exam of breasts, groin, buttocks: No    Other physical findings:  1. Multiple pigmented macules on extremities and trunk  2. Multiple pigmented macules on face, trunk and extremities  3. Multiple  vascular papules on trunk, arms and legs  4. Multiple scattered keratotic plaques         Except as noted above, no other signs of skin cancer or melanoma.     ASSESSMENT/PLAN:   Benign Full skin cancer screening today. . Patient with history of BCC  Advised on monthly self exams and 1 year  Patient Education: Appropriate brochures given.    Multiple benign appearing nevi on arms, legs and trunk. Discussed ABCDEs of melanoma and sunscreen.   Multiple lentigos on arms, legs and trunk. Advised benign, no treatment needed.  Multiple scattered angiomas. Advised benign, no treatment needed.   Seborrheic keratosis on arms, legs and trunk. Advised benign, no treatment needed.            Follow-up: yearly FSE/PRN sooner  CC:   1.) Patient was asked about new and changing moles. YES  2.) Patient received a complete physical skin examination: YES  3.) Patient was counseled to perform a monthly self skin examination: YES  Scribed By: ]Neena Wu, MS, PA-C

## 2024-05-23 NOTE — LETTER
5/23/2024         RE: Bright Andrew  [address]  [facility] [address]        Dear Colleague,    Thank you for referring your patient, Bright Andrew, to the [facility]. Please see a copy of my visit note below.    HPI:   CC: Bright Andrew is a 59 year old male who presents for Full skin cancer screening to rule out skin cancer.   Last Skin Exam: 1 year ago    1st Baseline: no  Personal HX of Skin Cancer: Yes BCC on the left superior helix 4/2022   Personal HX of Malignant Melanoma: no   Family HX of Skin Cancer / Malignant Melanoma: Yes mother and father both with NMSC  Personal HX of Atypical Moles:   no  Risk factors: sun exposure  New / Changing lesions: no  Social History: Retired 4 years ago; has been traveling a lot. 4 grandchildren.  Went to [place] for 6 weeks over the winter. Will travel to [place] in the fall.   On review of systems, there are no further skin complaints, patient is feeling otherwise well.  See patient intake sheet.  ROS of the following were done and are negative: Constitutional, Eyes, Ears, Nose,   Mouth, Throat, Cardiovascular, Respiratory, GI, Genitourinary, Musculoskeletal,   Psychiatric, Endocrine, Allergic/Immunologic.      PHYSICAL EXAM:   There were no vitals taken for this visit.  Skin exam performed as follows: Type 2 skin. Mood appropriate  Alert and Oriented X 3. Well developed, well nourished in no distress.  General appearance: Normal  Head including face: Normal  Eyes: conjunctiva and lids: Normal  Mouth: Lips, mucosa: Normal  Neck: Normal  Chest-breast/axillae: Normal  Back: Normal  Abdomen: Normal  Cardiovascular: Exam of peripheral vascular system by observation for swelling, varicosities, edema: Normal  Genitalia: groin, buttocks: Normal  Extremities: digits/nails (clubbing): Normal  Eccrine and Apocrine glands: Normal  Right upper extremity: Normal  Left upper extremity: Normal  Right lower extremity: Normal  Left lower extremity:  Normal  Skin: Scalp and body hair: See below    Pt deferred exam of breasts, groin, buttocks: No    Other physical findings:  1. Multiple pigmented macules on extremities and trunk  2. Multiple pigmented macules on face, trunk and extremities  3. Multiple vascular papules on trunk, arms and legs  4. Multiple scattered keratotic plaques         Except as noted above, no other signs of skin cancer or melanoma.     ASSESSMENT/PLAN:   Benign Full skin cancer screening today. . Patient with history of BCC  Advised on monthly self exams and 1 year  Patient Education: Appropriate brochures given.    Multiple benign appearing nevi on arms, legs and trunk. Discussed ABCDEs of melanoma and sunscreen.   Multiple lentigos on arms, legs and trunk. Advised benign, no treatment needed.  Multiple scattered angiomas. Advised benign, no treatment needed.   Seborrheic keratosis on arms, legs and trunk. Advised benign, no treatment needed.            Follow-up: yearly FSE/PRN sooner  CC:   1.) Patient was asked about new and changing moles. YES  2.) Patient received a complete physical skin examination: YES  3.) Patient was counseled to perform a monthly self skin examination: YES  Scribed By: ]Neena Wu, MS, PASERVANDO        Again, thank you for allowing me to participate in the care of your patient.        Sincerely,        Neena Wu PA-C

## 2024-05-28 ENCOUNTER — MYC MEDICAL ADVICE (OUTPATIENT)
Dept: FAMILY MEDICINE | Facility: CLINIC | Age: 59
End: 2024-05-28

## 2024-08-12 DIAGNOSIS — E78.2 MIXED HYPERLIPIDEMIA: ICD-10-CM

## 2024-08-12 RX ORDER — SIMVASTATIN 20 MG
20 TABLET ORAL AT BEDTIME
Qty: 90 TABLET | Refills: 0 | Status: SHIPPED | OUTPATIENT
Start: 2024-08-12 | End: 2024-08-20

## 2024-08-12 NOTE — TELEPHONE ENCOUNTER
Spoke with patient. Informed him of refill sent to pharmacy. Patient in good understanding.    Tamra Evans on 8/12/2024 at 2:11 PM

## 2024-08-12 NOTE — TELEPHONE ENCOUNTER
Covering for provider. 90 day refill provided. Will need further refills at follow up visit with Dr. Reynolds.

## 2024-08-15 ENCOUNTER — DOCUMENTATION ONLY (OUTPATIENT)
Dept: FAMILY MEDICINE | Facility: CLINIC | Age: 59
End: 2024-08-15

## 2024-08-15 ENCOUNTER — LAB (OUTPATIENT)
Dept: LAB | Facility: CLINIC | Age: 59
End: 2024-08-15
Payer: COMMERCIAL

## 2024-08-15 DIAGNOSIS — Z13.220 SCREENING FOR HYPERLIPIDEMIA: Primary | ICD-10-CM

## 2024-08-15 DIAGNOSIS — Z12.5 SCREENING FOR PROSTATE CANCER: Primary | ICD-10-CM

## 2024-08-15 DIAGNOSIS — E78.5 HYPERLIPIDEMIA LDL GOAL <130: ICD-10-CM

## 2024-08-15 DIAGNOSIS — Z12.5 SCREENING FOR PROSTATE CANCER: ICD-10-CM

## 2024-08-15 LAB
CHOLEST SERPL-MCNC: 194 MG/DL
FASTING STATUS PATIENT QL REPORTED: YES
HDLC SERPL-MCNC: 44 MG/DL
LDLC SERPL CALC-MCNC: 131 MG/DL
NONHDLC SERPL-MCNC: 150 MG/DL
PSA SERPL DL<=0.01 NG/ML-MCNC: 1.36 NG/ML (ref 0–3.5)
TRIGL SERPL-MCNC: 93 MG/DL

## 2024-08-15 PROCEDURE — 80061 LIPID PANEL: CPT

## 2024-08-15 PROCEDURE — 36415 COLL VENOUS BLD VENIPUNCTURE: CPT

## 2024-08-15 PROCEDURE — G0103 PSA SCREENING: HCPCS

## 2024-08-15 SDOH — HEALTH STABILITY: PHYSICAL HEALTH: ON AVERAGE, HOW MANY MINUTES DO YOU ENGAGE IN EXERCISE AT THIS LEVEL?: 60 MIN

## 2024-08-15 SDOH — HEALTH STABILITY: PHYSICAL HEALTH: ON AVERAGE, HOW MANY DAYS PER WEEK DO YOU ENGAGE IN MODERATE TO STRENUOUS EXERCISE (LIKE A BRISK WALK)?: 2 DAYS

## 2024-08-15 ASSESSMENT — SOCIAL DETERMINANTS OF HEALTH (SDOH): HOW OFTEN DO YOU GET TOGETHER WITH FRIENDS OR RELATIVES?: TWICE A WEEK

## 2024-08-15 NOTE — PROGRESS NOTES
Patient was here for lab appointment. Patient stated that he needs to have a PSA done. Please place order as Add-On if needed. If not needed please inform patient.  Thank you,  Lab

## 2024-08-20 ENCOUNTER — OFFICE VISIT (OUTPATIENT)
Dept: FAMILY MEDICINE | Facility: CLINIC | Age: 59
End: 2024-08-20
Payer: COMMERCIAL

## 2024-08-20 VITALS
OXYGEN SATURATION: 97 % | TEMPERATURE: 97 F | SYSTOLIC BLOOD PRESSURE: 136 MMHG | HEIGHT: 74 IN | WEIGHT: 217 LBS | HEART RATE: 83 BPM | BODY MASS INDEX: 27.85 KG/M2 | RESPIRATION RATE: 16 BRPM | DIASTOLIC BLOOD PRESSURE: 82 MMHG

## 2024-08-20 DIAGNOSIS — Z00.00 ROUTINE GENERAL MEDICAL EXAMINATION AT A HEALTH CARE FACILITY: Primary | ICD-10-CM

## 2024-08-20 DIAGNOSIS — E78.2 MIXED HYPERLIPIDEMIA: ICD-10-CM

## 2024-08-20 PROCEDURE — 99213 OFFICE O/P EST LOW 20 MIN: CPT | Mod: 25 | Performed by: FAMILY MEDICINE

## 2024-08-20 PROCEDURE — 99396 PREV VISIT EST AGE 40-64: CPT | Mod: 25 | Performed by: FAMILY MEDICINE

## 2024-08-20 PROCEDURE — 90471 IMMUNIZATION ADMIN: CPT | Performed by: FAMILY MEDICINE

## 2024-08-20 PROCEDURE — 90715 TDAP VACCINE 7 YRS/> IM: CPT | Performed by: FAMILY MEDICINE

## 2024-08-20 RX ORDER — SIMVASTATIN 20 MG
20 TABLET ORAL AT BEDTIME
Qty: 90 TABLET | Refills: 3 | Status: SHIPPED | OUTPATIENT
Start: 2024-11-11

## 2024-08-20 ASSESSMENT — PAIN SCALES - GENERAL: PAINLEVEL: NO PAIN (0)

## 2024-08-20 NOTE — PATIENT INSTRUCTIONS
Patient Education     Be consistent with low salt, low trans fat and low saturated fat diet.  Eat food rich in omega-3-fatty acids as you tolerate. (salmon, olive oil)  Eat 5 cups of vegetables, fruits and whole grains per day.  Limit starchy food (white rice, white bread, white pasta, white potatoes) to less than a cup per meal.  Minimize sweets, junk food and fastfood. Limit soda beverages to one serving per day; best to avoid it altogether though.    Exercise: moderate intensity sustained for at least 30 mins per episode, goal of 150 mins per week at least  Combine cardiovascular and resistance exercises.  These exercise recommendations are in addition to your daily activity at work or home.  Work on losing weight.    Continue simvastatin.    Get new covid vaccine in the fall, as well as flu shot.    Preventative Care Visits include: Yearly physicals, Well-child visits, Welcome to Medicare visits, & Medicare yearly wellness exams.    The purpose of these visits is to discuss your medical history and prevent health problems before you are sick.  You may need to pay a copay, coinsurance or deductible if your visit today includes testing or treating for a new or existing condition.    Additional charges may be incurred for today's visit. If you have questions about what your insurance plan covers, please contact your Insurance Company's member service department.  If you have questions specific to a bill you have already received from [employer] please contact the [employer] Billing Office at [phone].      Preventive Care Advice   This is general advice given by our system to help you stay healthy. However, your care team may have specific advice just for you. Please talk to your care team about your preventive care needs.  Nutrition  Eat 5 or more servings of fruits and vegetables each day.  Try wheat bread, brown rice and whole grain pasta (instead of white bread, rice, and pasta).  Get enough calcium and  vitamin D. Check the label on foods and aim for 100% of the RDA (recommended daily allowance).  Lifestyle  Exercise at least 150 minutes each week  (30 minutes a day, 5 days a week).  Do muscle strengthening activities 2 days a week. These help control your weight and prevent disease.  No smoking.  Wear sunscreen to prevent skin cancer.  Have a dental exam and cleaning every 6 months.  Yearly exams  See your health care team every year to talk about:  Any changes in your health.  Any medicines your care team has prescribed.  Preventive care, family planning, and ways to prevent chronic diseases.  Shots (vaccines)   HPV shots (up to age 26), if you've never had them before.  Hepatitis B shots (up to age 59), if you've never had them before.  COVID-19 shot: Get this shot when it's due.  Flu shot: Get a flu shot every year.  Tetanus shot: Get a tetanus shot every 10 years.  Pneumococcal, hepatitis A, and RSV shots: Ask your care team if you need these based on your risk.  Shingles shot (for age 50 and up)  General health tests  Diabetes screening:  Starting at age 35, Get screened for diabetes at least every 3 years.  If you are younger than age 35, ask your care team if you should be screened for diabetes.  Cholesterol test: At age 39, start having a cholesterol test every 5 years, or more often if advised.  Bone density scan (DEXA): At age 50, ask your care team if you should have this scan for osteoporosis (brittle bones).  Hepatitis C: Get tested at least once in your life.  STIs (sexually transmitted infections)  Before age 24: Ask your care team if you should be screened for STIs.  After age 24: Get screened for STIs if you're at risk. You are at risk for STIs (including HIV) if:  You are sexually active with more than one person.  You don't use condoms every time.  You or a partner was diagnosed with a sexually transmitted infection.  If you are at risk for HIV, ask about PrEP medicine to prevent HIV.  Get  tested for HIV at least once in your life, whether you are at risk for HIV or not.  Cancer screening tests  Cervical cancer screening: If you have a cervix, begin getting regular cervical cancer screening tests starting at age 21.  Breast cancer scan (mammogram): If you've ever had breasts, begin having regular mammograms starting at age 40. This is a scan to check for breast cancer.  Colon cancer screening: It is important to start screening for colon cancer at age 45.  Have a colonoscopy test every 10 years (or more often if you're at risk) Or, ask your provider about stool tests like a FIT test every year or Cologuard test every 3 years.  To learn more about your testing options, visit:   .  For help making a decision, visit:   https://bit.ly/ux94236.  Prostate cancer screening test: If you have a prostate, ask your care team if a prostate cancer screening test (PSA) at age 55 is right for you.  Lung cancer screening: If you are a current or former smoker ages 50 to 80, ask your care team if ongoing lung cancer screenings are right for you.  For informational purposes only. Not to replace the advice of your health care provider. Copyright   2023 [employer]. All rights reserved. Clinically reviewed by the [facility] Transitions Program. [employer] [id number] - REV 01/24.

## 2024-08-20 NOTE — PROGRESS NOTES
"Preventive Care Visit  [facility]  Moises Reynolds MD, Family Medicine  Aug 20, 2024      Assessment & Plan     Routine general medical examination at a health care facility  Patient was advised on recommended screening and preventive health recommendations.  He verbalized understanding and agreed to the plans below.     Mixed hyperlipidemia  Reinforced heart healthy lifestyle.   Up to date on labs.  Continue statin to keep LDL from increasing and to reduce cardiovascular disease risk  - simvastatin (ZOCOR) 20 MG tablet  Dispense: 90 tablet; Refill: 3      Patient has been advised of split billing requirements and indicates understanding: Yes        BMI  Estimated body mass index is 28.24 kg/m  as calculated from the following:    Height as of this encounter: 1.867 m (6' 1.5\").    Weight as of this encounter: 98.4 kg (217 lb).   Weight management plan: Discussed healthy diet and exercise guidelines    Counseling  Appropriate preventive services were addressed with this patient via screening, questionnaire, or discussion as appropriate for fall prevention, nutrition, physical activity, Tobacco-use cessation, social engagement, weight loss and cognition.  Checklist reviewing preventive services available has been given to the patient.  Reviewed patient's diet, addressing concerns and/or questions.   He is at risk for lack of exercise and has been provided with information to increase physical activity for the benefit of his well-being.       Patient Instructions   Patient Education    Be consistent with low salt, low trans fat and low saturated fat diet.  Eat food rich in omega-3-fatty acids as you tolerate. (salmon, olive oil)  Eat 5 cups of vegetables, fruits and whole grains per day.  Limit starchy food (white rice, white bread, white pasta, white potatoes) to less than a cup per meal.  Minimize sweets, junk food and fastfood. Limit soda beverages to one serving per day; best to " avoid it altogether though.    Exercise: moderate intensity sustained for at least 30 mins per episode, goal of 150 mins per week at least  Combine cardiovascular and resistance exercises.  These exercise recommendations are in addition to your daily activity at work or home.  Work on losing weight.    Continue simvastatin.    Get new covid vaccine in the fall, as well as flu shot.    Preventative Care Visits include: Yearly physicals, Well-child visits, Welcome to Medicare visits, & Medicare yearly wellness exams.    The purpose of these visits is to discuss your medical history and prevent health problems before you are sick.  You may need to pay a copay, coinsurance or deductible if your visit today includes testing or treating for a new or existing condition.    Additional charges may be incurred for today's visit. If you have questions about what your insurance plan covers, please contact your Insurance Company's member service department.  If you have questions specific to a bill you have already received from [employer], please contact the [employer] Billing Office at [phone].      Preventive Care Advice   This is general advice given by our system to help you stay healthy. However, your care team may have specific advice just for you. Please talk to your care team about your preventive care needs.  Nutrition  Eat 5 or more servings of fruits and vegetables each day.  Try wheat bread, brown rice and whole grain pasta (instead of white bread, rice, and pasta).  Get enough calcium and vitamin D. Check the label on foods and aim for 100% of the RDA (recommended daily allowance).  Lifestyle  Exercise at least 150 minutes each week  (30 minutes a day, 5 days a week).  Do muscle strengthening activities 2 days a week. These help control your weight and prevent disease.  No smoking.  Wear sunscreen to prevent skin cancer.  Have a dental exam and cleaning every 6 months.  Yearly exams  See your health care team  every year to talk about:  Any changes in your health.  Any medicines your care team has prescribed.  Preventive care, family planning, and ways to prevent chronic diseases.  Shots (vaccines)   HPV shots (up to age 26), if you've never had them before.  Hepatitis B shots (up to age 59), if you've never had them before.  COVID-19 shot: Get this shot when it's due.  Flu shot: Get a flu shot every year.  Tetanus shot: Get a tetanus shot every 10 years.  Pneumococcal, hepatitis A, and RSV shots: Ask your care team if you need these based on your risk.  Shingles shot (for age 50 and up)  General health tests  Diabetes screening:  Starting at age 35, Get screened for diabetes at least every 3 years.  If you are younger than age 35, ask your care team if you should be screened for diabetes.  Cholesterol test: At age 39, start having a cholesterol test every 5 years, or more often if advised.  Bone density scan (DEXA): At age 50, ask your care team if you should have this scan for osteoporosis (brittle bones).  Hepatitis C: Get tested at least once in your life.  STIs (sexually transmitted infections)  Before age 24: Ask your care team if you should be screened for STIs.  After age 24: Get screened for STIs if you're at risk. You are at risk for STIs (including HIV) if:  You are sexually active with more than one person.  You don't use condoms every time.  You or a partner was diagnosed with a sexually transmitted infection.  If you are at risk for HIV, ask about PrEP medicine to prevent HIV.  Get tested for HIV at least once in your life, whether you are at risk for HIV or not.  Cancer screening tests  Cervical cancer screening: If you have a cervix, begin getting regular cervical cancer screening tests starting at age 21.  Breast cancer scan (mammogram): If you've ever had breasts, begin having regular mammograms starting at age 40. This is a scan to check for breast cancer.  Colon cancer screening: It is important to  start screening for colon cancer at age 45.  Have a colonoscopy test every 10 years (or more often if you're at risk) Or, ask your provider about stool tests like a FIT test every year or Cologuard test every 3 years.  To learn more about your testing options, visit:   .  For help making a decision, visit:   https://bit.ly/mi08162.  Prostate cancer screening test: If you have a prostate, ask your care team if a prostate cancer screening test (PSA) at age 55 is right for you.  Lung cancer screening: If you are a current or former smoker ages 50 to 80, ask your care team if ongoing lung cancer screenings are right for you.  For informational purposes only. Not to replace the advice of your health care provider. Copyright   2023 [employer]. All rights reserved. Clinically reviewed by the [facility] Transitions Program. [employer] [id number] - REV 01/24.       Ozzie Novoa is a 59 year old, presenting for the following:  Physical        8/20/2024     8:45 AM   Additional Questions   Roomed by Kia SAL MA   Accompanied by self         8/20/2024     8:45 AM   Patient Reported Additional Medications   Patient reports taking the following new medications none        Health Care Directive  Patient does not have a Health Care Directive or Living Will: Patient states has Advance Directive and will bring in a copy to clinic.    HPI      Hyperlipidemia Follow-Up    Are you regularly taking any medication or supplement to lower your cholesterol?   Yes- simvastatin  Are you having muscle aches or other side effects that you think could be caused by your cholesterol lowering medication?  No        8/15/2024   General Health   How would you rate your overall physical health? Excellent   Feel stress (tense, anxious, or unable to sleep) Not at all            8/15/2024   Nutrition   Three or more servings of calcium each day? Yes   Diet: Low fat/cholesterol   How many servings of fruit and vegetables per day?  (!) 2-3   How many sweetened beverages each day? 0-1            8/15/2024   Exercise   Days per week of moderate/strenous exercise 2 days   Average minutes spent exercising at this level 60 min      (!) EXERCISE CONCERN      8/15/2024   Social Factors   Frequency of gathering with friends or relatives Twice a week   Worry food won't last until get money to buy more No   Food not last or not have enough money for food? No   Do you have housing? (Housing is defined as stable permanent housing and does not include staying ouside in a car, in a tent, in an abandoned building, in an overnight shelter, or couch-surfing.) Yes   Are you worried about losing your housing? No   Lack of transportation? No   Unable to get utilities (heat,electricity)? No            8/15/2024   Fall Risk   Fallen 2 or more times in the past year? No   Trouble with walking or balance? No             8/15/2024   Dental   Dentist two times every year? Yes            8/15/2024   TB Screening   Were you born outside of the [place]? No              Today's PHQ-2 Score:       1/22/2024    10:52 AM   PHQ-2 ( 1999 [employer])   Q1: Little interest or pleasure in doing things 0   Q2: Feeling down, depressed or hopeless 0   PHQ-2 Score 0         8/15/2024   Substance Use   Alcohol more than 3/day or more than 7/wk No   Do you use any other substances recreationally? No        Social History     Tobacco Use    Smoking status: Never    Smokeless tobacco: Never   Vaping Use    Vaping status: Never Used   Substance Use Topics    Alcohol use: Yes     Comment: few times a week    Drug use: No           8/15/2024   STI Screening   New sexual partner(s) since last STI/HIV test? No      Last PSA:   PSA   Date Value Ref Range Status   07/29/2020 0.98 0 - 4 ug/L Final     Comment:     Assay Method:  Chemiluminescence using Siemens Vista analyzer     Prostate Specific Antigen Screen   Date Value Ref Range Status   08/15/2024 1.36 0.00 - 3.50 ng/mL Final   07/11/2022 1.26 0.00  - 4.00 ug/L Final     ASCVD Risk   The 10-year ASCVD risk score (Pineda GOLDEN, et al., 2019) is: 9.4%    Values used to calculate the score:      Age: 59 years      Sex: Male      Is Non- : No      Diabetic: No      Tobacco smoker: No      Systolic Blood Pressure: 136 mmHg      Is BP treated: No      HDL Cholesterol: 44 mg/dL      Total Cholesterol: 194 mg/dL           Reviewed and updated as needed this visit by Provider                    Past Medical History:   Diagnosis Date    Basal cell carcinoma     Cellulitis and abscess of unspecified site     recurrent     Chest pain, unspecified      Past Surgical History:   Procedure Laterality Date    COLONOSCOPY N/A 11/20/2015    Procedure: COLONOSCOPY;  Surgeon: Melquiades Street MD;  Location: [facility]     Patient Active Problem List   Diagnosis    Hyperlipidemia LDL goal <130    Screening for hypertension    FH: cardiovascular disease    Lumbago    Overweight (BMI 25.0-29.9)     Past Surgical History:   Procedure Laterality Date    COLONOSCOPY N/A 11/20/2015    Procedure: COLONOSCOPY;  Surgeon: Melquiades Street MD;  Location: [facility]       Social History     Tobacco Use    Smoking status: Never    Smokeless tobacco: Never   Substance Use Topics    Alcohol use: Yes     Comment: few times a week     Family History   Problem Relation Age of Onset    C.A.D. Maternal Grandfather     Cardiovascular Maternal Grandfather     Heart Disease Father         congenital heart dz and blockage    Cancer Father         lung    Skin Cancer Father     Skin Cancer Mother          Current Outpatient Medications   Medication Sig Dispense Refill    aspirin 81 MG tablet Take 81 mg by mouth daily Patient takes every other day      loratadine (CLARITIN) 10 MG tablet Take 10 mg by mouth daily as needed.      simvastatin (ZOCOR) 20 MG tablet TAKE 1 TABLET (20 MG) BY MOUTH AT BEDTIME 90 tablet 0    albuterol (PROAIR HFA/PROVENTIL HFA/VENTOLIN HFA) 108 (90  "Base) MCG/ACT inhaler Inhale 2 puffs into the lungs every 4 hours as needed for shortness of breath / dyspnea or wheezing (Patient not taking: Reported on 8/15/2023) 18 g 0     No Known Allergies      Review of Systems  Constitutional, HEENT, cardiovascular, pulmonary, GI, , musculoskeletal, neuro, skin, endocrine and psych systems are negative, except as otherwise noted.     Objective    Exam  /82 (BP Location: Right arm, Patient Position: Sitting, Cuff Size: Adult Large)   Pulse 83   Temp 97  F (36.1  C) (Tympanic)   Resp 16   Ht 1.867 m (6' 1.5\")   Wt 98.4 kg (217 lb)   SpO2 97%   BMI 28.24 kg/m     Estimated body mass index is 28.24 kg/m  as calculated from the following:    Height as of this encounter: 1.867 m (6' 1.5\").    Weight as of this encounter: 98.4 kg (217 lb).    Physical Exam  GENERAL APPEARANCE:  overweight, alert and no distress  EYES: pink conj, no icterus, PERRL, EOMI  HENT: ear canals and TM's normal, nose and mouth without ulcers or lesions, oropharynx clear and oral mucous membranes moist  NECK: no adenopathy, no asymmetry, masses, or scars and thyroid normal to palpation  RESP: lungs clear to auscultation - no rales, rhonchi or wheezes  CV: regular rates and rhythm, normal S1 S2, no S3 or S4, no murmur, click or rub, no peripheral edema and peripheral pulses strong  ABDOMEN: soft, nontender, no hepatosplenomegaly, no masses and bowel sounds normal  RECTAL: deferred  MS: no musculoskeletal defects are noted and gait is age appropriate without ataxia  SKIN: no suspicious lesions or rashes  NEURO: Normal strength and tone, sensory exam grossly normal, mentation intact and speech normal         Signed Electronically by: Moises Reynolds MD    "

## 2024-08-20 NOTE — NURSING NOTE
Prior to immunization administration, verified patients identity using patient s name and date of birth. Please see Immunization Activity for additional information.     Screening Questionnaire for Adult Immunization    Are you sick today?   No   Do you have allergies to medications, food, a vaccine component or latex?   No   Have you ever had a serious reaction after receiving a vaccination?   No   Do you have a long-term health problem with heart, lung, kidney, or metabolic disease (e.g., diabetes), asthma, a blood disorder, no spleen, complement component deficiency, a cochlear implant, or a spinal fluid leak?  Are you on long-term aspirin therapy?   No   Do you have cancer, leukemia, HIV/AIDS, or any other immune system problem?   No   Do you have a parent, brother, or sister with an immune system problem?   No   In the past 3 months, have you taken medications that affect  your immune system, such as prednisone, other steroids, or anticancer drugs; drugs for the treatment of rheumatoid arthritis, Crohn s disease, or psoriasis; or have you had radiation treatments?   No   Have you had a seizure, or a brain or other nervous system problem?   No   During the past year, have you received a transfusion of blood or blood    products, or been given immune (gamma) globulin or antiviral drug?   No   For women: Are you pregnant or is there a chance you could become       pregnant during the next month?   No   Have you received any vaccinations in the past 4 weeks?   No     Immunization questionnaire answers were all negative.      Patient instructed to remain in clinic for 15 minutes afterwards, and to report any adverse reactions.     Screening performed by Kia Elise MA on 8/20/2024 at 9:23 AM.

## 2025-04-03 ENCOUNTER — E-VISIT (OUTPATIENT)
Dept: URGENT CARE | Facility: CLINIC | Age: 60
End: 2025-04-03
Payer: COMMERCIAL

## 2025-04-03 DIAGNOSIS — R05.1 ACUTE COUGH: Primary | ICD-10-CM

## 2025-04-03 NOTE — PATIENT INSTRUCTIONS
Dear Bright Andrew,    We are sorry you are not feeling well. Based on the responses you provided, it is recommended that you be seen in-person in urgent care to better evaluate your symptoms. Due to licensing laws, we are not able to treat patients who are not physically located in either [place] or [place].    Alta Lagos PA-C

## 2025-05-02 ENCOUNTER — MYC MEDICAL ADVICE (OUTPATIENT)
Dept: FAMILY MEDICINE | Facility: CLINIC | Age: 60
End: 2025-05-02
Payer: COMMERCIAL

## 2025-05-02 DIAGNOSIS — M21.40 PES PLANOVALGUS, ACQUIRED, UNSPECIFIED LATERALITY: Primary | ICD-10-CM
